# Patient Record
Sex: MALE | Race: WHITE | NOT HISPANIC OR LATINO | URBAN - METROPOLITAN AREA
[De-identification: names, ages, dates, MRNs, and addresses within clinical notes are randomized per-mention and may not be internally consistent; named-entity substitution may affect disease eponyms.]

---

## 2020-01-01 ENCOUNTER — INPATIENT (INPATIENT)
Facility: HOSPITAL | Age: 65
LOS: 2 days | DRG: 193 | End: 2020-08-22
Attending: HOSPITALIST | Admitting: HOSPITALIST
Payer: COMMERCIAL

## 2020-01-01 VITALS
HEART RATE: 91 BPM | WEIGHT: 162.04 LBS | RESPIRATION RATE: 22 BRPM | SYSTOLIC BLOOD PRESSURE: 100 MMHG | TEMPERATURE: 98 F | DIASTOLIC BLOOD PRESSURE: 67 MMHG | OXYGEN SATURATION: 97 %

## 2020-01-01 VITALS — OXYGEN SATURATION: 92 %

## 2020-01-01 DIAGNOSIS — Z02.9 ENCOUNTER FOR ADMINISTRATIVE EXAMINATIONS, UNSPECIFIED: ICD-10-CM

## 2020-01-01 DIAGNOSIS — J96.01 ACUTE RESPIRATORY FAILURE WITH HYPOXIA: ICD-10-CM

## 2020-01-01 DIAGNOSIS — E87.2 ACIDOSIS: ICD-10-CM

## 2020-01-01 DIAGNOSIS — E87.1 HYPO-OSMOLALITY AND HYPONATREMIA: ICD-10-CM

## 2020-01-01 DIAGNOSIS — R18.8 OTHER ASCITES: ICD-10-CM

## 2020-01-01 DIAGNOSIS — Z51.5 ENCOUNTER FOR PALLIATIVE CARE: ICD-10-CM

## 2020-01-01 DIAGNOSIS — J44.9 CHRONIC OBSTRUCTIVE PULMONARY DISEASE, UNSPECIFIED: ICD-10-CM

## 2020-01-01 DIAGNOSIS — R64 CACHEXIA: ICD-10-CM

## 2020-01-01 DIAGNOSIS — E87.5 HYPERKALEMIA: ICD-10-CM

## 2020-01-01 DIAGNOSIS — R06.02 SHORTNESS OF BREATH: ICD-10-CM

## 2020-01-01 DIAGNOSIS — C25.9 MALIGNANT NEOPLASM OF PANCREAS, UNSPECIFIED: ICD-10-CM

## 2020-01-01 DIAGNOSIS — Z29.9 ENCOUNTER FOR PROPHYLACTIC MEASURES, UNSPECIFIED: ICD-10-CM

## 2020-01-01 DIAGNOSIS — R11.2 NAUSEA WITH VOMITING, UNSPECIFIED: ICD-10-CM

## 2020-01-01 DIAGNOSIS — G93.41 METABOLIC ENCEPHALOPATHY: ICD-10-CM

## 2020-01-01 DIAGNOSIS — N17.9 ACUTE KIDNEY FAILURE, UNSPECIFIED: ICD-10-CM

## 2020-01-01 DIAGNOSIS — Z96.641 PRESENCE OF RIGHT ARTIFICIAL HIP JOINT: Chronic | ICD-10-CM

## 2020-01-01 DIAGNOSIS — R53.2 FUNCTIONAL QUADRIPLEGIA: ICD-10-CM

## 2020-01-01 LAB
ALBUMIN FLD-MCNC: 1.4 G/DL — SIGNIFICANT CHANGE UP
ALBUMIN SERPL ELPH-MCNC: 3.1 G/DL — LOW (ref 3.3–5)
ALBUMIN SERPL ELPH-MCNC: 3.2 G/DL — LOW (ref 3.3–5)
ALBUMIN SERPL ELPH-MCNC: 3.2 G/DL — LOW (ref 3.3–5)
ALP SERPL-CCNC: 132 U/L — HIGH (ref 40–120)
ALP SERPL-CCNC: 167 U/L — HIGH (ref 40–120)
ALP SERPL-CCNC: 167 U/L — HIGH (ref 40–120)
ALT FLD-CCNC: 18 U/L — SIGNIFICANT CHANGE UP (ref 10–45)
ALT FLD-CCNC: 23 U/L — SIGNIFICANT CHANGE UP (ref 10–45)
ALT FLD-CCNC: 25 U/L — SIGNIFICANT CHANGE UP (ref 10–45)
ANION GAP SERPL CALC-SCNC: 15 MMOL/L — SIGNIFICANT CHANGE UP (ref 5–17)
ANION GAP SERPL CALC-SCNC: 16 MMOL/L — SIGNIFICANT CHANGE UP (ref 5–17)
ANION GAP SERPL CALC-SCNC: 17 MMOL/L — SIGNIFICANT CHANGE UP (ref 5–17)
ANION GAP SERPL CALC-SCNC: 17 MMOL/L — SIGNIFICANT CHANGE UP (ref 5–17)
ANION GAP SERPL CALC-SCNC: 19 MMOL/L — HIGH (ref 5–17)
ANION GAP SERPL CALC-SCNC: 21 MMOL/L — HIGH (ref 5–17)
ANISOCYTOSIS BLD QL: SLIGHT — SIGNIFICANT CHANGE UP
APPEARANCE UR: ABNORMAL
APPEARANCE UR: ABNORMAL
APTT BLD: 31.4 SEC — SIGNIFICANT CHANGE UP (ref 27.5–35.5)
APTT BLD: 32 SEC — SIGNIFICANT CHANGE UP (ref 27.5–35.5)
AST SERPL-CCNC: 18 U/L — SIGNIFICANT CHANGE UP (ref 10–40)
AST SERPL-CCNC: 23 U/L — SIGNIFICANT CHANGE UP (ref 10–40)
AST SERPL-CCNC: 27 U/L — SIGNIFICANT CHANGE UP (ref 10–40)
B PERT IGG+IGM PNL SER: ABNORMAL
BACTERIA # UR AUTO: NEGATIVE — SIGNIFICANT CHANGE UP
BASE EXCESS BLDV CALC-SCNC: -4.7 MMOL/L — LOW (ref -2–2)
BASOPHILS # BLD AUTO: 0 K/UL — SIGNIFICANT CHANGE UP (ref 0–0.2)
BASOPHILS # BLD AUTO: 0.02 K/UL — SIGNIFICANT CHANGE UP (ref 0–0.2)
BASOPHILS NFR BLD AUTO: 0 % — SIGNIFICANT CHANGE UP (ref 0–2)
BASOPHILS NFR BLD AUTO: 0.2 % — SIGNIFICANT CHANGE UP (ref 0–2)
BILIRUB SERPL-MCNC: 1 MG/DL — SIGNIFICANT CHANGE UP (ref 0.2–1.2)
BILIRUB SERPL-MCNC: 1.1 MG/DL — SIGNIFICANT CHANGE UP (ref 0.2–1.2)
BILIRUB SERPL-MCNC: 1.1 MG/DL — SIGNIFICANT CHANGE UP (ref 0.2–1.2)
BILIRUB UR-MCNC: NEGATIVE — SIGNIFICANT CHANGE UP
BILIRUB UR-MCNC: NEGATIVE — SIGNIFICANT CHANGE UP
BUN SERPL-MCNC: 78 MG/DL — HIGH (ref 7–23)
BUN SERPL-MCNC: 81 MG/DL — HIGH (ref 7–23)
BUN SERPL-MCNC: 89 MG/DL — HIGH (ref 7–23)
BUN SERPL-MCNC: 91 MG/DL — HIGH (ref 7–23)
BUN SERPL-MCNC: 92 MG/DL — HIGH (ref 7–23)
BUN SERPL-MCNC: 92 MG/DL — HIGH (ref 7–23)
CA-I SERPL-SCNC: 1.17 MMOL/L — SIGNIFICANT CHANGE UP (ref 1.12–1.3)
CALCIUM SERPL-MCNC: 8.5 MG/DL — SIGNIFICANT CHANGE UP (ref 8.4–10.5)
CALCIUM SERPL-MCNC: 8.7 MG/DL — SIGNIFICANT CHANGE UP (ref 8.4–10.5)
CALCIUM SERPL-MCNC: 8.7 MG/DL — SIGNIFICANT CHANGE UP (ref 8.4–10.5)
CALCIUM SERPL-MCNC: 8.8 MG/DL — SIGNIFICANT CHANGE UP (ref 8.4–10.5)
CALCIUM SERPL-MCNC: 8.8 MG/DL — SIGNIFICANT CHANGE UP (ref 8.4–10.5)
CALCIUM SERPL-MCNC: 8.9 MG/DL — SIGNIFICANT CHANGE UP (ref 8.4–10.5)
CHLORIDE BLDV-SCNC: 94 MMOL/L — LOW (ref 96–108)
CHLORIDE SERPL-SCNC: 88 MMOL/L — LOW (ref 96–108)
CHLORIDE SERPL-SCNC: 89 MMOL/L — LOW (ref 96–108)
CHLORIDE SERPL-SCNC: 90 MMOL/L — LOW (ref 96–108)
CHLORIDE SERPL-SCNC: 90 MMOL/L — LOW (ref 96–108)
CHLORIDE SERPL-SCNC: 91 MMOL/L — LOW (ref 96–108)
CHLORIDE SERPL-SCNC: 91 MMOL/L — LOW (ref 96–108)
CO2 BLDV-SCNC: 23 MMOL/L — SIGNIFICANT CHANGE UP (ref 22–30)
CO2 SERPL-SCNC: 17 MMOL/L — LOW (ref 22–31)
CO2 SERPL-SCNC: 18 MMOL/L — LOW (ref 22–31)
CO2 SERPL-SCNC: 18 MMOL/L — LOW (ref 22–31)
CO2 SERPL-SCNC: 19 MMOL/L — LOW (ref 22–31)
CO2 SERPL-SCNC: 20 MMOL/L — LOW (ref 22–31)
CO2 SERPL-SCNC: <10 MMOL/L — CRITICAL LOW (ref 22–31)
COLOR FLD: SIGNIFICANT CHANGE UP
COLOR SPEC: SIGNIFICANT CHANGE UP
COLOR SPEC: YELLOW — SIGNIFICANT CHANGE UP
COMMENT - FLUIDS: SIGNIFICANT CHANGE UP
CREAT ?TM UR-MCNC: 60 MG/DL — SIGNIFICANT CHANGE UP
CREAT SERPL-MCNC: 1.71 MG/DL — HIGH (ref 0.5–1.3)
CREAT SERPL-MCNC: 1.73 MG/DL — HIGH (ref 0.5–1.3)
CREAT SERPL-MCNC: 1.88 MG/DL — HIGH (ref 0.5–1.3)
CREAT SERPL-MCNC: 1.88 MG/DL — HIGH (ref 0.5–1.3)
CREAT SERPL-MCNC: 1.89 MG/DL — HIGH (ref 0.5–1.3)
CREAT SERPL-MCNC: 1.99 MG/DL — HIGH (ref 0.5–1.3)
CULTURE RESULTS: SIGNIFICANT CHANGE UP
DACRYOCYTES BLD QL SMEAR: SLIGHT — SIGNIFICANT CHANGE UP
DIFF PNL FLD: ABNORMAL
DIFF PNL FLD: ABNORMAL
EOSINOPHIL # BLD AUTO: 0 K/UL — SIGNIFICANT CHANGE UP (ref 0–0.5)
EOSINOPHIL # BLD AUTO: 0.01 K/UL — SIGNIFICANT CHANGE UP (ref 0–0.5)
EOSINOPHIL NFR BLD AUTO: 0 % — SIGNIFICANT CHANGE UP (ref 0–6)
EOSINOPHIL NFR BLD AUTO: 0.1 % — SIGNIFICANT CHANGE UP (ref 0–6)
EPI CELLS # UR: 1 — SIGNIFICANT CHANGE UP
FLUID INTAKE SUBSTANCE CLASS: SIGNIFICANT CHANGE UP
FLUID SEGMENTED GRANULOCYTES: 11 % — SIGNIFICANT CHANGE UP
GAS PNL BLDV: 123 MMOL/L — LOW (ref 135–145)
GAS PNL BLDV: SIGNIFICANT CHANGE UP
GLUCOSE BLDV-MCNC: 193 MG/DL — HIGH (ref 70–99)
GLUCOSE FLD-MCNC: 168 MG/DL — SIGNIFICANT CHANGE UP
GLUCOSE SERPL-MCNC: 146 MG/DL — HIGH (ref 70–99)
GLUCOSE SERPL-MCNC: 196 MG/DL — HIGH (ref 70–99)
GLUCOSE SERPL-MCNC: 208 MG/DL — HIGH (ref 70–99)
GLUCOSE SERPL-MCNC: 209 MG/DL — HIGH (ref 70–99)
GLUCOSE SERPL-MCNC: 213 MG/DL — HIGH (ref 70–99)
GLUCOSE SERPL-MCNC: 248 MG/DL — HIGH (ref 70–99)
GLUCOSE UR QL: NEGATIVE — SIGNIFICANT CHANGE UP
GLUCOSE UR QL: NEGATIVE — SIGNIFICANT CHANGE UP
GRAM STN FLD: SIGNIFICANT CHANGE UP
GRAM STN FLD: SIGNIFICANT CHANGE UP
HCO3 BLDV-SCNC: 22 MMOL/L — SIGNIFICANT CHANGE UP (ref 21–29)
HCT VFR BLD CALC: 31.1 % — LOW (ref 39–50)
HCT VFR BLD CALC: 31.4 % — LOW (ref 39–50)
HCT VFR BLD CALC: 32.5 % — LOW (ref 39–50)
HCT VFR BLDA CALC: 33 % — LOW (ref 39–50)
HCV AB S/CO SERPL IA: 0.09 S/CO — SIGNIFICANT CHANGE UP (ref 0–0.99)
HCV AB SERPL-IMP: SIGNIFICANT CHANGE UP
HGB BLD CALC-MCNC: 10.7 G/DL — LOW (ref 13–17)
HGB BLD-MCNC: 10.2 G/DL — LOW (ref 13–17)
HGB BLD-MCNC: 10.3 G/DL — LOW (ref 13–17)
HGB BLD-MCNC: 10.8 G/DL — LOW (ref 13–17)
HYALINE CASTS # UR AUTO: 1 /LPF — SIGNIFICANT CHANGE UP (ref 0–7)
IMM GRANULOCYTES NFR BLD AUTO: 0.7 % — SIGNIFICANT CHANGE UP (ref 0–1.5)
INR BLD: 1.54 RATIO — HIGH (ref 0.88–1.16)
INR BLD: 1.63 RATIO — HIGH (ref 0.88–1.16)
KETONES UR-MCNC: NEGATIVE — SIGNIFICANT CHANGE UP
KETONES UR-MCNC: NEGATIVE — SIGNIFICANT CHANGE UP
LACTATE BLDV-MCNC: 2.4 MMOL/L — HIGH (ref 0.7–2)
LACTATE BLDV-MCNC: 2.6 MMOL/L — HIGH (ref 0.7–2)
LDH SERPL L TO P-CCNC: 196 U/L — SIGNIFICANT CHANGE UP
LEUKOCYTE ESTERASE UR-ACNC: ABNORMAL
LEUKOCYTE ESTERASE UR-ACNC: NEGATIVE — SIGNIFICANT CHANGE UP
LIDOCAIN IGE QN: 15 U/L — SIGNIFICANT CHANGE UP (ref 7–60)
LYMPHOCYTES # BLD AUTO: 0 % — LOW (ref 13–44)
LYMPHOCYTES # BLD AUTO: 0 K/UL — LOW (ref 1–3.3)
LYMPHOCYTES # BLD AUTO: 0.31 K/UL — LOW (ref 1–3.3)
LYMPHOCYTES # BLD AUTO: 2.6 % — LOW (ref 13–44)
LYMPHOCYTES # FLD: 76 % — SIGNIFICANT CHANGE UP
MACROCYTES BLD QL: SLIGHT — SIGNIFICANT CHANGE UP
MAGNESIUM SERPL-MCNC: 2.5 MG/DL — SIGNIFICANT CHANGE UP (ref 1.6–2.6)
MAGNESIUM SERPL-MCNC: 2.6 MG/DL — SIGNIFICANT CHANGE UP (ref 1.6–2.6)
MAGNESIUM SERPL-MCNC: 2.7 MG/DL — HIGH (ref 1.6–2.6)
MAGNESIUM SERPL-MCNC: 2.8 MG/DL — HIGH (ref 1.6–2.6)
MANUAL SMEAR VERIFICATION: SIGNIFICANT CHANGE UP
MCHC RBC-ENTMCNC: 30.3 PG — SIGNIFICANT CHANGE UP (ref 27–34)
MCHC RBC-ENTMCNC: 30.3 PG — SIGNIFICANT CHANGE UP (ref 27–34)
MCHC RBC-ENTMCNC: 30.5 PG — SIGNIFICANT CHANGE UP (ref 27–34)
MCHC RBC-ENTMCNC: 32.5 GM/DL — SIGNIFICANT CHANGE UP (ref 32–36)
MCHC RBC-ENTMCNC: 33.1 GM/DL — SIGNIFICANT CHANGE UP (ref 32–36)
MCHC RBC-ENTMCNC: 33.2 GM/DL — SIGNIFICANT CHANGE UP (ref 32–36)
MCV RBC AUTO: 91.5 FL — SIGNIFICANT CHANGE UP (ref 80–100)
MCV RBC AUTO: 91.8 FL — SIGNIFICANT CHANGE UP (ref 80–100)
MCV RBC AUTO: 93.2 FL — SIGNIFICANT CHANGE UP (ref 80–100)
MESOTHL CELL # FLD: 1 % — SIGNIFICANT CHANGE UP
METAMYELOCYTES # FLD: 2.7 % — HIGH (ref 0–0)
METHOD TYPE: SIGNIFICANT CHANGE UP
MONOCYTES # BLD AUTO: 0.29 K/UL — SIGNIFICANT CHANGE UP (ref 0–0.9)
MONOCYTES # BLD AUTO: 0.73 K/UL — SIGNIFICANT CHANGE UP (ref 0–0.9)
MONOCYTES NFR BLD AUTO: 3.6 % — SIGNIFICANT CHANGE UP (ref 2–14)
MONOCYTES NFR BLD AUTO: 6.2 % — SIGNIFICANT CHANGE UP (ref 2–14)
MONOS+MACROS # FLD: 10 % — SIGNIFICANT CHANGE UP
MYELOCYTES NFR BLD: 2.7 % — HIGH (ref 0–0)
NEUTROPHILS # BLD AUTO: 10.7 K/UL — HIGH (ref 1.8–7.4)
NEUTROPHILS # BLD AUTO: 7.39 K/UL — SIGNIFICANT CHANGE UP (ref 1.8–7.4)
NEUTROPHILS NFR BLD AUTO: 79.4 % — HIGH (ref 43–77)
NEUTROPHILS NFR BLD AUTO: 90.2 % — HIGH (ref 43–77)
NEUTS BAND # BLD: 11.6 % — HIGH (ref 0–8)
NITRITE UR-MCNC: NEGATIVE — SIGNIFICANT CHANGE UP
NITRITE UR-MCNC: NEGATIVE — SIGNIFICANT CHANGE UP
NRBC # BLD: 0 /100 WBCS — SIGNIFICANT CHANGE UP (ref 0–0)
NRBC # BLD: 0 /100 WBCS — SIGNIFICANT CHANGE UP (ref 0–0)
OSMOLALITY SERPL: 305 MOSMOL/KG — HIGH (ref 280–301)
OSMOLALITY UR: 361 MOS/KG — SIGNIFICANT CHANGE UP (ref 300–900)
OTHER CELLS FLD MANUAL: 2 % — SIGNIFICANT CHANGE UP
P AERUGINOSA DNA BLD POS NAA+NON-PROBE: SIGNIFICANT CHANGE UP
PCO2 BLDV: 48 MMHG — SIGNIFICANT CHANGE UP (ref 35–50)
PH BLDV: 7.28 — LOW (ref 7.35–7.45)
PH UR: 6 — SIGNIFICANT CHANGE UP (ref 5–8)
PH UR: 6 — SIGNIFICANT CHANGE UP (ref 5–8)
PHOSPHATE SERPL-MCNC: 4 MG/DL — SIGNIFICANT CHANGE UP (ref 2.5–4.5)
PHOSPHATE SERPL-MCNC: 4.5 MG/DL — SIGNIFICANT CHANGE UP (ref 2.5–4.5)
PHOSPHATE SERPL-MCNC: 4.6 MG/DL — HIGH (ref 2.5–4.5)
PHOSPHATE SERPL-MCNC: 5.1 MG/DL — HIGH (ref 2.5–4.5)
PLAT MORPH BLD: ABNORMAL
PLATELET # BLD AUTO: 210 K/UL — SIGNIFICANT CHANGE UP (ref 150–400)
PLATELET # BLD AUTO: 220 K/UL — SIGNIFICANT CHANGE UP (ref 150–400)
PLATELET # BLD AUTO: 223 K/UL — SIGNIFICANT CHANGE UP (ref 150–400)
PO2 BLDV: 29 MMHG — SIGNIFICANT CHANGE UP (ref 25–45)
POIKILOCYTOSIS BLD QL AUTO: SLIGHT — SIGNIFICANT CHANGE UP
POLYCHROMASIA BLD QL SMEAR: SLIGHT — SIGNIFICANT CHANGE UP
POTASSIUM BLDV-SCNC: 5.3 MMOL/L — SIGNIFICANT CHANGE UP (ref 3.5–5.3)
POTASSIUM SERPL-MCNC: 4.7 MMOL/L — SIGNIFICANT CHANGE UP (ref 3.5–5.3)
POTASSIUM SERPL-MCNC: 4.9 MMOL/L — SIGNIFICANT CHANGE UP (ref 3.5–5.3)
POTASSIUM SERPL-MCNC: 5.3 MMOL/L — SIGNIFICANT CHANGE UP (ref 3.5–5.3)
POTASSIUM SERPL-MCNC: 5.3 MMOL/L — SIGNIFICANT CHANGE UP (ref 3.5–5.3)
POTASSIUM SERPL-MCNC: 5.7 MMOL/L — HIGH (ref 3.5–5.3)
POTASSIUM SERPL-MCNC: 5.9 MMOL/L — HIGH (ref 3.5–5.3)
POTASSIUM SERPL-SCNC: 4.7 MMOL/L — SIGNIFICANT CHANGE UP (ref 3.5–5.3)
POTASSIUM SERPL-SCNC: 4.9 MMOL/L — SIGNIFICANT CHANGE UP (ref 3.5–5.3)
POTASSIUM SERPL-SCNC: 5.3 MMOL/L — SIGNIFICANT CHANGE UP (ref 3.5–5.3)
POTASSIUM SERPL-SCNC: 5.3 MMOL/L — SIGNIFICANT CHANGE UP (ref 3.5–5.3)
POTASSIUM SERPL-SCNC: 5.7 MMOL/L — HIGH (ref 3.5–5.3)
POTASSIUM SERPL-SCNC: 5.9 MMOL/L — HIGH (ref 3.5–5.3)
PROT FLD-MCNC: 2.3 G/DL — SIGNIFICANT CHANGE UP
PROT SERPL-MCNC: 5.4 G/DL — LOW (ref 6–8.3)
PROT SERPL-MCNC: 5.8 G/DL — LOW (ref 6–8.3)
PROT SERPL-MCNC: 6 G/DL — SIGNIFICANT CHANGE UP (ref 6–8.3)
PROT UR-MCNC: ABNORMAL
PROT UR-MCNC: NEGATIVE — SIGNIFICANT CHANGE UP
PROTHROM AB SERPL-ACNC: 18 SEC — HIGH (ref 10.6–13.6)
PROTHROM AB SERPL-ACNC: 19 SEC — HIGH (ref 10.6–13.6)
RBC # BLD: 3.37 M/UL — LOW (ref 4.2–5.8)
RBC # BLD: 3.4 M/UL — LOW (ref 4.2–5.8)
RBC # BLD: 3.54 M/UL — LOW (ref 4.2–5.8)
RBC # FLD: 14.8 % — HIGH (ref 10.3–14.5)
RBC # FLD: 15.2 % — HIGH (ref 10.3–14.5)
RBC # FLD: 15.2 % — HIGH (ref 10.3–14.5)
RBC BLD AUTO: ABNORMAL
RBC CASTS # UR COMP ASSIST: >50 /HPF — HIGH (ref 0–4)
RCV VOL RI: HIGH /UL (ref 0–0)
SAO2 % BLDV: 48 % — LOW (ref 67–88)
SARS-COV-2 IGG SERPL QL IA: NEGATIVE — SIGNIFICANT CHANGE UP
SARS-COV-2 IGM SERPL IA-ACNC: <0.1 INDEX — SIGNIFICANT CHANGE UP
SARS-COV-2 RNA SPEC QL NAA+PROBE: SIGNIFICANT CHANGE UP
SODIUM SERPL-SCNC: 122 MMOL/L — LOW (ref 135–145)
SODIUM SERPL-SCNC: 124 MMOL/L — LOW (ref 135–145)
SODIUM SERPL-SCNC: 125 MMOL/L — LOW (ref 135–145)
SODIUM UR-SCNC: <35 MMOL/L — SIGNIFICANT CHANGE UP
SP GR SPEC: 1 — LOW (ref 1.01–1.02)
SP GR SPEC: 1.02 — SIGNIFICANT CHANGE UP (ref 1.01–1.02)
SPECIMEN SOURCE: SIGNIFICANT CHANGE UP
SPECIMEN SOURCE: SIGNIFICANT CHANGE UP
TOTAL NUCLEATED CELL COUNT, BODY FLUID: 52 /UL — SIGNIFICANT CHANGE UP
TROPONIN T, HIGH SENSITIVITY RESULT: 72 NG/L — HIGH (ref 0–51)
TROPONIN T, HIGH SENSITIVITY RESULT: 76 NG/L — HIGH (ref 0–51)
TUBE TYPE: SIGNIFICANT CHANGE UP
UROBILINOGEN FLD QL: ABNORMAL
UROBILINOGEN FLD QL: NEGATIVE — SIGNIFICANT CHANGE UP
UUN UR-MCNC: 586 MG/DL — SIGNIFICANT CHANGE UP
WBC # BLD: 11.85 K/UL — HIGH (ref 3.8–10.5)
WBC # BLD: 13.41 K/UL — HIGH (ref 3.8–10.5)
WBC # BLD: 8.12 K/UL — SIGNIFICANT CHANGE UP (ref 3.8–10.5)
WBC # FLD AUTO: 11.85 K/UL — HIGH (ref 3.8–10.5)
WBC # FLD AUTO: 13.41 K/UL — HIGH (ref 3.8–10.5)
WBC # FLD AUTO: 8.12 K/UL — SIGNIFICANT CHANGE UP (ref 3.8–10.5)
WBC UR QL: 2 /HPF — SIGNIFICANT CHANGE UP (ref 0–5)

## 2020-01-01 PROCEDURE — 87102 FUNGUS ISOLATION CULTURE: CPT

## 2020-01-01 PROCEDURE — 87086 URINE CULTURE/COLONY COUNT: CPT

## 2020-01-01 PROCEDURE — 83690 ASSAY OF LIPASE: CPT

## 2020-01-01 PROCEDURE — 93308 TTE F-UP OR LMTD: CPT | Mod: 26

## 2020-01-01 PROCEDURE — 82565 ASSAY OF CREATININE: CPT

## 2020-01-01 PROCEDURE — 84540 ASSAY OF URINE/UREA-N: CPT

## 2020-01-01 PROCEDURE — 99233 SBSQ HOSP IP/OBS HIGH 50: CPT | Mod: GC

## 2020-01-01 PROCEDURE — 83935 ASSAY OF URINE OSMOLALITY: CPT

## 2020-01-01 PROCEDURE — 49083 ABD PARACENTESIS W/IMAGING: CPT

## 2020-01-01 PROCEDURE — 82042 OTHER SOURCE ALBUMIN QUAN EA: CPT

## 2020-01-01 PROCEDURE — 84300 ASSAY OF URINE SODIUM: CPT

## 2020-01-01 PROCEDURE — 82945 GLUCOSE OTHER FLUID: CPT

## 2020-01-01 PROCEDURE — 76700 US EXAM ABDOM COMPLETE: CPT

## 2020-01-01 PROCEDURE — 83930 ASSAY OF BLOOD OSMOLALITY: CPT

## 2020-01-01 PROCEDURE — 82570 ASSAY OF URINE CREATININE: CPT

## 2020-01-01 PROCEDURE — U0003: CPT

## 2020-01-01 PROCEDURE — 97162 PT EVAL MOD COMPLEX 30 MIN: CPT

## 2020-01-01 PROCEDURE — 71250 CT THORAX DX C-: CPT | Mod: 26

## 2020-01-01 PROCEDURE — 82947 ASSAY GLUCOSE BLOOD QUANT: CPT

## 2020-01-01 PROCEDURE — 88112 CYTOPATH CELL ENHANCE TECH: CPT

## 2020-01-01 PROCEDURE — 88305 TISSUE EXAM BY PATHOLOGIST: CPT

## 2020-01-01 PROCEDURE — 93306 TTE W/DOPPLER COMPLETE: CPT | Mod: 26

## 2020-01-01 PROCEDURE — 74176 CT ABD & PELVIS W/O CONTRAST: CPT

## 2020-01-01 PROCEDURE — 80053 COMPREHEN METABOLIC PANEL: CPT

## 2020-01-01 PROCEDURE — 88305 TISSUE EXAM BY PATHOLOGIST: CPT | Mod: 26

## 2020-01-01 PROCEDURE — 84484 ASSAY OF TROPONIN QUANT: CPT

## 2020-01-01 PROCEDURE — 93005 ELECTROCARDIOGRAM TRACING: CPT

## 2020-01-01 PROCEDURE — 71250 CT THORAX DX C-: CPT

## 2020-01-01 PROCEDURE — 93306 TTE W/DOPPLER COMPLETE: CPT

## 2020-01-01 PROCEDURE — 81001 URINALYSIS AUTO W/SCOPE: CPT

## 2020-01-01 PROCEDURE — 71045 X-RAY EXAM CHEST 1 VIEW: CPT | Mod: 26

## 2020-01-01 PROCEDURE — 84132 ASSAY OF SERUM POTASSIUM: CPT

## 2020-01-01 PROCEDURE — 87070 CULTURE OTHR SPECIMN AEROBIC: CPT

## 2020-01-01 PROCEDURE — 88112 CYTOPATH CELL ENHANCE TECH: CPT | Mod: 26

## 2020-01-01 PROCEDURE — 87150 DNA/RNA AMPLIFIED PROBE: CPT

## 2020-01-01 PROCEDURE — 99223 1ST HOSP IP/OBS HIGH 75: CPT | Mod: GC

## 2020-01-01 PROCEDURE — 82962 GLUCOSE BLOOD TEST: CPT

## 2020-01-01 PROCEDURE — 83615 LACTATE (LD) (LDH) ENZYME: CPT

## 2020-01-01 PROCEDURE — 86769 SARS-COV-2 COVID-19 ANTIBODY: CPT

## 2020-01-01 PROCEDURE — 85027 COMPLETE CBC AUTOMATED: CPT

## 2020-01-01 PROCEDURE — 83735 ASSAY OF MAGNESIUM: CPT

## 2020-01-01 PROCEDURE — 93010 ELECTROCARDIOGRAM REPORT: CPT

## 2020-01-01 PROCEDURE — 89051 BODY FLUID CELL COUNT: CPT

## 2020-01-01 PROCEDURE — 82330 ASSAY OF CALCIUM: CPT

## 2020-01-01 PROCEDURE — 85610 PROTHROMBIN TIME: CPT

## 2020-01-01 PROCEDURE — 82803 BLOOD GASES ANY COMBINATION: CPT

## 2020-01-01 PROCEDURE — 93308 TTE F-UP OR LMTD: CPT

## 2020-01-01 PROCEDURE — 99223 1ST HOSP IP/OBS HIGH 75: CPT

## 2020-01-01 PROCEDURE — 99233 SBSQ HOSP IP/OBS HIGH 50: CPT

## 2020-01-01 PROCEDURE — 80048 BASIC METABOLIC PNL TOTAL CA: CPT

## 2020-01-01 PROCEDURE — 99285 EMERGENCY DEPT VISIT HI MDM: CPT | Mod: GC

## 2020-01-01 PROCEDURE — 84100 ASSAY OF PHOSPHORUS: CPT

## 2020-01-01 PROCEDURE — 87040 BLOOD CULTURE FOR BACTERIA: CPT

## 2020-01-01 PROCEDURE — 76700 US EXAM ABDOM COMPLETE: CPT | Mod: 26

## 2020-01-01 PROCEDURE — 87186 SC STD MICRODIL/AGAR DIL: CPT

## 2020-01-01 PROCEDURE — 85730 THROMBOPLASTIN TIME PARTIAL: CPT

## 2020-01-01 PROCEDURE — 84295 ASSAY OF SERUM SODIUM: CPT

## 2020-01-01 PROCEDURE — 87075 CULTR BACTERIA EXCEPT BLOOD: CPT

## 2020-01-01 PROCEDURE — 82435 ASSAY OF BLOOD CHLORIDE: CPT

## 2020-01-01 PROCEDURE — 86803 HEPATITIS C AB TEST: CPT

## 2020-01-01 PROCEDURE — P9047: CPT

## 2020-01-01 PROCEDURE — 71045 X-RAY EXAM CHEST 1 VIEW: CPT

## 2020-01-01 PROCEDURE — 94640 AIRWAY INHALATION TREATMENT: CPT

## 2020-01-01 PROCEDURE — 87205 SMEAR GRAM STAIN: CPT

## 2020-01-01 PROCEDURE — 74176 CT ABD & PELVIS W/O CONTRAST: CPT | Mod: 26

## 2020-01-01 PROCEDURE — 85014 HEMATOCRIT: CPT

## 2020-01-01 PROCEDURE — 88108 CYTOPATH CONCENTRATE TECH: CPT | Mod: 26,59

## 2020-01-01 PROCEDURE — C1729: CPT

## 2020-01-01 PROCEDURE — 83605 ASSAY OF LACTIC ACID: CPT

## 2020-01-01 PROCEDURE — 84157 ASSAY OF PROTEIN OTHER: CPT

## 2020-01-01 RX ORDER — BUPRENORPHINE AND NALOXONE 2; .5 MG/1; MG/1
2 TABLET SUBLINGUAL DAILY
Refills: 0 | Status: DISCONTINUED | OUTPATIENT
Start: 2020-01-01 | End: 2020-01-01

## 2020-01-01 RX ORDER — ACETAMINOPHEN 500 MG
2 TABLET ORAL
Qty: 0 | Refills: 0 | DISCHARGE

## 2020-01-01 RX ORDER — IPRATROPIUM/ALBUTEROL SULFATE 18-103MCG
3 AEROSOL WITH ADAPTER (GRAM) INHALATION EVERY 6 HOURS
Refills: 0 | Status: DISCONTINUED | OUTPATIENT
Start: 2020-01-01 | End: 2020-01-01

## 2020-01-01 RX ORDER — DOCUSATE SODIUM 100 MG
2 CAPSULE ORAL
Qty: 0 | Refills: 0 | DISCHARGE

## 2020-01-01 RX ORDER — HYDROMORPHONE HYDROCHLORIDE 2 MG/ML
1 INJECTION INTRAMUSCULAR; INTRAVENOUS; SUBCUTANEOUS EVERY 4 HOURS
Refills: 0 | Status: DISCONTINUED | OUTPATIENT
Start: 2020-01-01 | End: 2020-01-01

## 2020-01-01 RX ORDER — IPRATROPIUM/ALBUTEROL SULFATE 18-103MCG
3 AEROSOL WITH ADAPTER (GRAM) INHALATION
Qty: 0 | Refills: 0 | DISCHARGE

## 2020-01-01 RX ORDER — SODIUM CHLORIDE 9 MG/ML
3 INJECTION INTRAMUSCULAR; INTRAVENOUS; SUBCUTANEOUS
Refills: 0 | Status: DISCONTINUED | OUTPATIENT
Start: 2020-01-01 | End: 2020-01-01

## 2020-01-01 RX ORDER — METOCLOPRAMIDE HCL 10 MG
1 TABLET ORAL
Qty: 0 | Refills: 0 | DISCHARGE
Start: 2020-01-01

## 2020-01-01 RX ORDER — MIDODRINE HYDROCHLORIDE 2.5 MG/1
1 TABLET ORAL
Qty: 0 | Refills: 0 | DISCHARGE

## 2020-01-01 RX ORDER — MIDODRINE HYDROCHLORIDE 2.5 MG/1
20 TABLET ORAL EVERY 8 HOURS
Refills: 0 | Status: DISCONTINUED | OUTPATIENT
Start: 2020-01-01 | End: 2020-01-01

## 2020-01-01 RX ORDER — ALBUMIN HUMAN 25 %
100 VIAL (ML) INTRAVENOUS ONCE
Refills: 0 | Status: COMPLETED | OUTPATIENT
Start: 2020-01-01 | End: 2020-01-01

## 2020-01-01 RX ORDER — BUPRENORPHINE AND NALOXONE 2; .5 MG/1; MG/1
1 TABLET SUBLINGUAL
Refills: 0 | Status: DISCONTINUED | OUTPATIENT
Start: 2020-01-01 | End: 2020-01-01

## 2020-01-01 RX ORDER — ONDANSETRON 8 MG/1
4 TABLET, FILM COATED ORAL EVERY 8 HOURS
Refills: 0 | Status: DISCONTINUED | OUTPATIENT
Start: 2020-01-01 | End: 2020-01-01

## 2020-01-01 RX ORDER — SODIUM ZIRCONIUM CYCLOSILICATE 10 G/10G
10 POWDER, FOR SUSPENSION ORAL ONCE
Refills: 0 | Status: DISCONTINUED | OUTPATIENT
Start: 2020-01-01 | End: 2020-01-01

## 2020-01-01 RX ORDER — SIMETHICONE 80 MG/1
1 TABLET, CHEWABLE ORAL
Qty: 0 | Refills: 0 | DISCHARGE

## 2020-01-01 RX ORDER — ASCORBIC ACID 60 MG
1 TABLET,CHEWABLE ORAL
Qty: 0 | Refills: 0 | DISCHARGE

## 2020-01-01 RX ORDER — OMEPRAZOLE 10 MG/1
1 CAPSULE, DELAYED RELEASE ORAL
Qty: 0 | Refills: 0 | DISCHARGE

## 2020-01-01 RX ORDER — PIPERACILLIN AND TAZOBACTAM 4; .5 G/20ML; G/20ML
3.38 INJECTION, POWDER, LYOPHILIZED, FOR SOLUTION INTRAVENOUS ONCE
Refills: 0 | Status: COMPLETED | OUTPATIENT
Start: 2020-01-01 | End: 2020-01-01

## 2020-01-01 RX ORDER — APIXABAN 2.5 MG/1
2.5 TABLET, FILM COATED ORAL EVERY 12 HOURS
Refills: 0 | Status: DISCONTINUED | OUTPATIENT
Start: 2020-01-01 | End: 2020-01-01

## 2020-01-01 RX ORDER — SODIUM BICARBONATE 1 MEQ/ML
0.24 SYRINGE (ML) INTRAVENOUS
Qty: 150 | Refills: 0 | Status: DISCONTINUED | OUTPATIENT
Start: 2020-01-01 | End: 2020-01-01

## 2020-01-01 RX ORDER — OXYCODONE HYDROCHLORIDE 5 MG/1
1 TABLET ORAL
Qty: 0 | Refills: 0 | DISCHARGE

## 2020-01-01 RX ORDER — DEXTROSE 50 % IN WATER 50 %
50 SYRINGE (ML) INTRAVENOUS ONCE
Refills: 0 | Status: COMPLETED | OUTPATIENT
Start: 2020-01-01 | End: 2020-01-01

## 2020-01-01 RX ORDER — PIPERACILLIN AND TAZOBACTAM 4; .5 G/20ML; G/20ML
3.38 INJECTION, POWDER, LYOPHILIZED, FOR SOLUTION INTRAVENOUS EVERY 12 HOURS
Refills: 0 | Status: DISCONTINUED | OUTPATIENT
Start: 2020-01-01 | End: 2020-01-01

## 2020-01-01 RX ORDER — ACETAMINOPHEN 500 MG
1000 TABLET ORAL EVERY 6 HOURS
Refills: 0 | Status: DISCONTINUED | OUTPATIENT
Start: 2020-01-01 | End: 2020-01-01

## 2020-01-01 RX ORDER — HYDROMORPHONE HYDROCHLORIDE 2 MG/ML
2 INJECTION INTRAMUSCULAR; INTRAVENOUS; SUBCUTANEOUS
Refills: 0 | Status: DISCONTINUED | OUTPATIENT
Start: 2020-01-01 | End: 2020-01-01

## 2020-01-01 RX ORDER — MIDODRINE HYDROCHLORIDE 2.5 MG/1
10 TABLET ORAL EVERY 8 HOURS
Refills: 0 | Status: DISCONTINUED | OUTPATIENT
Start: 2020-01-01 | End: 2020-01-01

## 2020-01-01 RX ORDER — MORPHINE SULFATE 50 MG/1
0.5 CAPSULE, EXTENDED RELEASE ORAL ONCE
Refills: 0 | Status: DISCONTINUED | OUTPATIENT
Start: 2020-01-01 | End: 2020-01-01

## 2020-01-01 RX ORDER — METOCLOPRAMIDE HCL 10 MG
10 TABLET ORAL ONCE
Refills: 0 | Status: COMPLETED | OUTPATIENT
Start: 2020-01-01 | End: 2020-01-01

## 2020-01-01 RX ORDER — MAGNESIUM HYDROXIDE 400 MG/1
30 TABLET, CHEWABLE ORAL DAILY
Refills: 0 | Status: DISCONTINUED | OUTPATIENT
Start: 2020-01-01 | End: 2020-01-01

## 2020-01-01 RX ORDER — BUPRENORPHINE AND NALOXONE 2; .5 MG/1; MG/1
1 TABLET SUBLINGUAL
Qty: 0 | Refills: 0 | DISCHARGE

## 2020-01-01 RX ORDER — BUDESONIDE AND FORMOTEROL FUMARATE DIHYDRATE 160; 4.5 UG/1; UG/1
2 AEROSOL RESPIRATORY (INHALATION)
Refills: 0 | Status: DISCONTINUED | OUTPATIENT
Start: 2020-01-01 | End: 2020-01-01

## 2020-01-01 RX ORDER — APIXABAN 2.5 MG/1
1 TABLET, FILM COATED ORAL
Qty: 0 | Refills: 0 | DISCHARGE

## 2020-01-01 RX ORDER — METOCLOPRAMIDE HCL 10 MG
5 TABLET ORAL THREE TIMES A DAY
Refills: 0 | Status: DISCONTINUED | OUTPATIENT
Start: 2020-01-01 | End: 2020-01-01

## 2020-01-01 RX ORDER — ENOXAPARIN SODIUM 100 MG/ML
40 INJECTION SUBCUTANEOUS DAILY
Refills: 0 | Status: DISCONTINUED | OUTPATIENT
Start: 2020-01-01 | End: 2020-01-01

## 2020-01-01 RX ORDER — ACETAMINOPHEN 500 MG
650 TABLET ORAL EVERY 6 HOURS
Refills: 0 | Status: DISCONTINUED | OUTPATIENT
Start: 2020-01-01 | End: 2020-01-01

## 2020-01-01 RX ORDER — INSULIN LISPRO 100/ML
0 VIAL (ML) SUBCUTANEOUS
Qty: 0 | Refills: 0 | DISCHARGE
Start: 2020-01-01

## 2020-01-01 RX ORDER — MAGNESIUM HYDROXIDE 400 MG/1
30 TABLET, CHEWABLE ORAL
Qty: 0 | Refills: 0 | DISCHARGE

## 2020-01-01 RX ORDER — ONDANSETRON 8 MG/1
4 TABLET, FILM COATED ORAL ONCE
Refills: 0 | Status: COMPLETED | OUTPATIENT
Start: 2020-01-01 | End: 2020-01-01

## 2020-01-01 RX ORDER — HYDROMORPHONE HYDROCHLORIDE 2 MG/ML
1 INJECTION INTRAMUSCULAR; INTRAVENOUS; SUBCUTANEOUS ONCE
Refills: 0 | Status: DISCONTINUED | OUTPATIENT
Start: 2020-01-01 | End: 2020-01-01

## 2020-01-01 RX ORDER — CITRIC ACID/SODIUM CITRATE 300-500 MG
30 SOLUTION, ORAL ORAL
Refills: 0 | Status: DISCONTINUED | OUTPATIENT
Start: 2020-01-01 | End: 2020-01-01

## 2020-01-01 RX ORDER — INSULIN HUMAN 100 [IU]/ML
10 INJECTION, SOLUTION SUBCUTANEOUS ONCE
Refills: 0 | Status: COMPLETED | OUTPATIENT
Start: 2020-01-01 | End: 2020-01-01

## 2020-01-01 RX ORDER — ASPIRIN/CALCIUM CARB/MAGNESIUM 324 MG
1 TABLET ORAL
Qty: 0 | Refills: 0 | DISCHARGE

## 2020-01-01 RX ORDER — SIMETHICONE 80 MG/1
80 TABLET, CHEWABLE ORAL EVERY 8 HOURS
Refills: 0 | Status: DISCONTINUED | OUTPATIENT
Start: 2020-01-01 | End: 2020-01-01

## 2020-01-01 RX ORDER — SENNA PLUS 8.6 MG/1
1 TABLET ORAL
Qty: 0 | Refills: 0 | DISCHARGE

## 2020-01-01 RX ORDER — HYDROMORPHONE HYDROCHLORIDE 2 MG/ML
0.5 INJECTION INTRAMUSCULAR; INTRAVENOUS; SUBCUTANEOUS EVERY 4 HOURS
Refills: 0 | Status: DISCONTINUED | OUTPATIENT
Start: 2020-01-01 | End: 2020-01-01

## 2020-01-01 RX ORDER — SODIUM ZIRCONIUM CYCLOSILICATE 10 G/10G
10 POWDER, FOR SUSPENSION ORAL EVERY 8 HOURS
Refills: 0 | Status: DISCONTINUED | OUTPATIENT
Start: 2020-01-01 | End: 2020-01-01

## 2020-01-01 RX ORDER — CALCIUM GLUCONATE 100 MG/ML
1 VIAL (ML) INTRAVENOUS ONCE
Refills: 0 | Status: COMPLETED | OUTPATIENT
Start: 2020-01-01 | End: 2020-01-01

## 2020-01-01 RX ORDER — PANTOPRAZOLE SODIUM 20 MG/1
40 TABLET, DELAYED RELEASE ORAL
Refills: 0 | Status: DISCONTINUED | OUTPATIENT
Start: 2020-01-01 | End: 2020-01-01

## 2020-01-01 RX ORDER — ASCORBIC ACID 60 MG
500 TABLET,CHEWABLE ORAL DAILY
Refills: 0 | Status: DISCONTINUED | OUTPATIENT
Start: 2020-01-01 | End: 2020-01-01

## 2020-01-01 RX ORDER — SODIUM BICARBONATE 1 MEQ/ML
0.08 SYRINGE (ML) INTRAVENOUS
Qty: 50 | Refills: 0 | Status: DISCONTINUED | OUTPATIENT
Start: 2020-01-01 | End: 2020-01-01

## 2020-01-01 RX ADMIN — Medication 500 MILLIGRAM(S): at 11:24

## 2020-01-01 RX ADMIN — ONDANSETRON 4 MILLIGRAM(S): 8 TABLET, FILM COATED ORAL at 15:57

## 2020-01-01 RX ADMIN — HYDROMORPHONE HYDROCHLORIDE 1 MILLIGRAM(S): 2 INJECTION INTRAMUSCULAR; INTRAVENOUS; SUBCUTANEOUS at 18:05

## 2020-01-01 RX ADMIN — Medication 3 MILLILITER(S): at 23:25

## 2020-01-01 RX ADMIN — MIDODRINE HYDROCHLORIDE 20 MILLIGRAM(S): 2.5 TABLET ORAL at 06:56

## 2020-01-01 RX ADMIN — BUPRENORPHINE AND NALOXONE 1 TABLET(S): 2; .5 TABLET SUBLINGUAL at 18:24

## 2020-01-01 RX ADMIN — Medication 100 GRAM(S): at 21:29

## 2020-01-01 RX ADMIN — Medication 5 MILLIGRAM(S): at 21:40

## 2020-01-01 RX ADMIN — Medication 1 APPLICATION(S): at 17:52

## 2020-01-01 RX ADMIN — APIXABAN 2.5 MILLIGRAM(S): 2.5 TABLET, FILM COATED ORAL at 06:55

## 2020-01-01 RX ADMIN — Medication 10 MILLIGRAM(S): at 16:39

## 2020-01-01 RX ADMIN — HYDROMORPHONE HYDROCHLORIDE 1 MILLIGRAM(S): 2 INJECTION INTRAMUSCULAR; INTRAVENOUS; SUBCUTANEOUS at 21:26

## 2020-01-01 RX ADMIN — Medication 50 MILLILITER(S): at 12:26

## 2020-01-01 RX ADMIN — SODIUM CHLORIDE 3 MILLILITER(S): 9 INJECTION INTRAMUSCULAR; INTRAVENOUS; SUBCUTANEOUS at 17:20

## 2020-01-01 RX ADMIN — BUDESONIDE AND FORMOTEROL FUMARATE DIHYDRATE 2 PUFF(S): 160; 4.5 AEROSOL RESPIRATORY (INHALATION) at 06:39

## 2020-01-01 RX ADMIN — INSULIN HUMAN 10 UNIT(S): 100 INJECTION, SOLUTION SUBCUTANEOUS at 12:31

## 2020-01-01 RX ADMIN — MORPHINE SULFATE 0.5 MILLIGRAM(S): 50 CAPSULE, EXTENDED RELEASE ORAL at 11:32

## 2020-01-01 RX ADMIN — HYDROMORPHONE HYDROCHLORIDE 2 MILLIGRAM(S): 2 INJECTION INTRAMUSCULAR; INTRAVENOUS; SUBCUTANEOUS at 00:30

## 2020-01-01 RX ADMIN — APIXABAN 2.5 MILLIGRAM(S): 2.5 TABLET, FILM COATED ORAL at 05:26

## 2020-01-01 RX ADMIN — BUPRENORPHINE AND NALOXONE 1 TABLET(S): 2; .5 TABLET SUBLINGUAL at 06:56

## 2020-01-01 RX ADMIN — Medication 100 MEQ/KG/HR: at 06:57

## 2020-01-01 RX ADMIN — Medication 100 MEQ/KG/HR: at 20:20

## 2020-01-01 RX ADMIN — SODIUM ZIRCONIUM CYCLOSILICATE 10 GRAM(S): 10 POWDER, FOR SUSPENSION ORAL at 20:33

## 2020-01-01 RX ADMIN — Medication 3 MILLILITER(S): at 23:10

## 2020-01-01 RX ADMIN — ONDANSETRON 4 MILLIGRAM(S): 8 TABLET, FILM COATED ORAL at 10:45

## 2020-01-01 RX ADMIN — Medication 1 TABLET(S): at 11:24

## 2020-01-01 RX ADMIN — Medication 5 MILLIGRAM(S): at 05:26

## 2020-01-01 RX ADMIN — APIXABAN 2.5 MILLIGRAM(S): 2.5 TABLET, FILM COATED ORAL at 18:21

## 2020-01-01 RX ADMIN — Medication 3 MILLILITER(S): at 11:54

## 2020-01-01 RX ADMIN — Medication 1 APPLICATION(S): at 05:26

## 2020-01-01 RX ADMIN — Medication 50 MILLILITER(S): at 12:27

## 2020-01-01 RX ADMIN — Medication 1 TABLET(S): at 22:15

## 2020-01-01 RX ADMIN — Medication 5 MILLIGRAM(S): at 15:20

## 2020-01-01 RX ADMIN — Medication 1 TABLET(S): at 23:40

## 2020-01-01 RX ADMIN — PANTOPRAZOLE SODIUM 40 MILLIGRAM(S): 20 TABLET, DELAYED RELEASE ORAL at 05:27

## 2020-01-01 RX ADMIN — HYDROMORPHONE HYDROCHLORIDE 2 MILLIGRAM(S): 2 INJECTION INTRAMUSCULAR; INTRAVENOUS; SUBCUTANEOUS at 23:51

## 2020-01-01 RX ADMIN — Medication 3 MILLILITER(S): at 17:19

## 2020-01-01 RX ADMIN — HYDROMORPHONE HYDROCHLORIDE 1 MILLIGRAM(S): 2 INJECTION INTRAMUSCULAR; INTRAVENOUS; SUBCUTANEOUS at 18:02

## 2020-01-01 RX ADMIN — BUDESONIDE AND FORMOTEROL FUMARATE DIHYDRATE 2 PUFF(S): 160; 4.5 AEROSOL RESPIRATORY (INHALATION) at 06:02

## 2020-01-01 RX ADMIN — ONDANSETRON 4 MILLIGRAM(S): 8 TABLET, FILM COATED ORAL at 23:40

## 2020-01-01 RX ADMIN — PIPERACILLIN AND TAZOBACTAM 25 GRAM(S): 4; .5 INJECTION, POWDER, LYOPHILIZED, FOR SOLUTION INTRAVENOUS at 18:04

## 2020-01-01 RX ADMIN — BUPRENORPHINE AND NALOXONE 1 TABLET(S): 2; .5 TABLET SUBLINGUAL at 05:26

## 2020-01-01 RX ADMIN — SODIUM CHLORIDE 3 MILLILITER(S): 9 INJECTION INTRAMUSCULAR; INTRAVENOUS; SUBCUTANEOUS at 11:59

## 2020-01-01 RX ADMIN — PANTOPRAZOLE SODIUM 40 MILLIGRAM(S): 20 TABLET, DELAYED RELEASE ORAL at 06:56

## 2020-01-01 RX ADMIN — Medication 5 MILLIGRAM(S): at 22:15

## 2020-01-01 RX ADMIN — HYDROMORPHONE HYDROCHLORIDE 1 MILLIGRAM(S): 2 INJECTION INTRAMUSCULAR; INTRAVENOUS; SUBCUTANEOUS at 22:00

## 2020-01-01 RX ADMIN — HYDROMORPHONE HYDROCHLORIDE 1 MILLIGRAM(S): 2 INJECTION INTRAMUSCULAR; INTRAVENOUS; SUBCUTANEOUS at 02:12

## 2020-01-01 RX ADMIN — Medication 3 MILLILITER(S): at 05:58

## 2020-01-01 RX ADMIN — HYDROMORPHONE HYDROCHLORIDE 1 MILLIGRAM(S): 2 INJECTION INTRAMUSCULAR; INTRAVENOUS; SUBCUTANEOUS at 13:58

## 2020-01-01 RX ADMIN — SODIUM ZIRCONIUM CYCLOSILICATE 10 GRAM(S): 10 POWDER, FOR SUSPENSION ORAL at 06:55

## 2020-01-01 RX ADMIN — Medication 1 APPLICATION(S): at 06:56

## 2020-01-01 RX ADMIN — PIPERACILLIN AND TAZOBACTAM 200 GRAM(S): 4; .5 INJECTION, POWDER, LYOPHILIZED, FOR SOLUTION INTRAVENOUS at 07:52

## 2020-01-01 RX ADMIN — Medication 5 MILLIGRAM(S): at 06:56

## 2020-01-01 RX ADMIN — Medication 1 APPLICATION(S): at 18:24

## 2020-08-19 NOTE — ED PROVIDER NOTE - NEUROLOGICAL, MLM
Alert and oriented, no focal deficits, no motor or sensory deficits.
rehabilitation facility/acute rehabilitation

## 2020-08-19 NOTE — ED ADULT NURSE NOTE - NS ED NURSE LEVEL OF CONSCIOUSNESS MENTAL STATUS
BROCK Ruby informed of need for blood transfusion. Consent obtained over the phone. Alert/Awake/Cooperative

## 2020-08-19 NOTE — ED PROVIDER NOTE - PROGRESS NOTE DETAILS
Patient labs reviewed, leukocytosis present, hyponatremia present. Hyponatremia likely 2/2 cirrhosis and ascites at this time. will hold fluids given patients fluid status with cirrhosis and ascites. Echo results noted at this time. [facility] management at this time. primary team at bedside. notes patient allowed to have sips of water at this time.

## 2020-08-19 NOTE — H&P ADULT - PROBLEM SELECTOR PLAN 6
Patient with h/o COPD, likely contributing to pt's SOB  - continue home Symbicort  - continue nasal cannula oxygen

## 2020-08-19 NOTE — ED PROVIDER NOTE - OBJECTIVE STATEMENT
Patient is a 64 year old male with hx of jaundice, pancreatic cancer presenting for hypotension x 1 day. As per EMS patient from NH found to be hypotensive at NH to 90 systolic. Upon arrival patients BP 110s systolic. Patient denies chest pain, sob, fever, chills, headache, nausea, emesis, diarrhea, abdominal pain, hematuria/dysuria or lower extremity swelling. Patient notes he does not like to be at the NH he is at now as he thinks they do not take care of him appropriately.

## 2020-08-19 NOTE — H&P ADULT - NSHPLABSRESULTS_GEN_ALL_CORE
10.8   11.85 )-----------( 220      ( 19 Aug 2020 14:46 )             32.5           125<L>  |  90<L>  |  78<H>  ----------------------------<  196<H>  5.3   |  19<L>  |  1.71<H>    Ca    8.9      19 Aug 2020 14:46    TPro  6.0  /  Alb  3.2<L>  /  TBili  1.1  /  DBili  x   /  AST  27  /  ALT  25  /  AlkPhos  167<H>                Urinalysis Basic - ( 19 Aug 2020 17:12 )    Color: Yellow / Appearance: Slightly Turbid / S.019 / pH: x  Gluc: x / Ketone: Negative  / Bili: Negative / Urobili: 3 mg/dL   Blood: x / Protein: 30 mg/dL / Nitrite: Negative   Leuk Esterase: Small / RBC: >50 /hpf / WBC 2 /HPF   Sq Epi: x / Non Sq Epi: 1 / Bacteria: Negative    Lactate Trend: 2.6-->2.4  CXR: L-sided pleural effusion   TTE: trace pericardial effusion      CAPILLARY BLOOD GLUCOSE      POCT Blood Glucose.: 213 mg/dL (19 Aug 2020 14:28)

## 2020-08-19 NOTE — ED ADULT NURSE NOTE - OBJECTIVE STATEMENT
Pt BIBA from [facility] for "hypotension."  EMS states pt 90s/70s on scene, pt arrives with systolic in low 100s.  Pt with hx of pancreatic cancer, ascites, copd on 3L NC baseline, drug abuse.  Pt very ill appearing, cachectic, abdomen distended, firm, non-tender, c/o b/l arm pain (chronic), b/l LE wrapped in clean, dry gauze, patches of dark red skin discoloration to chest, redness along spine, denies chest pain, shortness of breath, fever, abdominal pain, nausea/vomiting/diarrhea, urinary symptoms.

## 2020-08-19 NOTE — ED PROVIDER NOTE - CARE PLAN
Principal Discharge DX:	Hyponatremia  Assessment and plan of treatment:	64 year old male with hx as bellow presenting for hypotension x 1 day. No signs of hypotension at this time. will r/o metabolic vs cardiac vs infectious etiology at this time.     Plan:   Labs  CXR  ECHO

## 2020-08-19 NOTE — H&P ADULT - PROBLEM SELECTOR PLAN 5
Patient with Na 125 on admission. Likely multifactorial, 2/2 hypervolemic hyponatremia in s/o ascites + hyponatremia from malignancy   - for now will monitor, and treat by addressing underlying causes as per above Multifactorial, likely 2/2 obstruction from ascites, hypotension prior to admission could be contributing   - will monitor BMP and obtain urine studies to further evaluate  - post-void bladder scan to r/o obstructive ANNMARIE given ascites Multifactorial, likely 2/2 obstruction from ascites, hypotension prior to admission could be contributing   - will monitor BMP and obtain urine studies to further evaluate  - post-void bladder scan to r/o obstructive ANNMARIE given ascites  - elevated troponins likely 2/2 demand ischemia in s/o renal insufficiency. No signs/symptoms of ACS, no need to trend trops unless new c/o chest pain

## 2020-08-19 NOTE — ED ADULT NURSE NOTE - PMH
Ascites    Drug abuse    Essential hypertension    Pancreatic cancer    Simple chronic bronchitis  with home o2 PRN

## 2020-08-19 NOTE — ED PROVIDER NOTE - ATTENDING CONTRIBUTION TO CARE
Attending MD Schneider: I personally have seen and examined this patient.  Resident note reviewed and agree on plan of care and except where noted.  See below for details.     Seen in [place] 3/18    64M with PMH/PSH including pancreatic cancer (dx'ed 6/2020 [facility]), COPD presents to the ED sent in from nursing home with hypotension, vomiting.  As per EMS, patient sent in for SBPs 90s.  Denies chest pain, shortness of breath, palpitations. Denies abdominal pain, diarrhea, blood in stools.  Reports nausea, vomiting.  Denies dysuria, hematuria, change in urinary habits including frequency, urgency. Denies change in vision, double vision, sudden loss of vision. Denies fevers, chills.  A ten (10) point review of systems was negative other than as stated in the HPI or elsewhere in the chart.     Exam:   General: NAD, thin, ill appearing  HENT: head NCAT, airway patent with moist mucous membranes, no sublingual jaundice  Eyes: PERRL, no scleral icterus  Lungs: lungs CTAB with good inspiratory effort, no wheezing, no rhonchi, no rales  Cardiac: +S1S2, no m/r/g  GI: abdomen soft with +BS, +diffuse tenderness, no rebound, no guarding, +distention  : no CVAT  MSK: FROM at neck, no tenderness to midline palpation, no stepoffs along length of spine  Neuro: moving all extremities, sensory grossly intact, no gross neuro deficits  Psych: normal mood and affect  Skin: no jaundice    A/P: 64M with recent pancreatic ca diagnosis, here for reported hypotension, now SBPs 110s, given reported emesis, will give antiemetic, will defer IVFs at this time given abdominal distention, will obtain ED Echo, will obtain labs to evaluate for electrolyte derangement, dehydration, obstructive pathology, will obtain EKG, CBC, CXR and UA for infectious process, admit

## 2020-08-19 NOTE — H&P ADULT - PROBLEM SELECTOR PLAN 7
Diet: DASH/TLC  DVT: apixaban 2.5 mg Q12H (a home med, further investigating history to find out why he is on this)  CODE: full code

## 2020-08-19 NOTE — H&P ADULT - PROBLEM SELECTOR PLAN 1
Per patient + daughter, has pancreatic cancer that was diagnosed in June 2020 at [facility], no current access to records of treatment for more detail. Patient cachectic and with ascites c/w a diagnosis of pancreatic cancer  - Will consult Oncology   - for now, supportive care as per below  - Patient currently with poor insight into illness. Current status is full code. Will continue goals of care discussion Per patient + daughter, has pancreatic cancer that was diagnosed in June 2020 at [facility], no current access to records of treatment for more detail. Patient cachectic and with ascites c/w a diagnosis of pancreatic cancer  - Will consult Oncology   - for now, supportive care as per below  - Patient currently with poor insight into illness. Current status is full code. Will continue goals of care discussion  - nutrition consult

## 2020-08-19 NOTE — H&P ADULT - PROBLEM SELECTOR PLAN 4
Multifactorial, likely 2/2 obstruction from ascites, hypotension prior to admission could be contributing   - will monitor BMP and obtain urine studies to further evaluate Patient with Na 125 on admission. Likely multifactorial, 2/2 hypervolemic hyponatremia in s/o ascites + hyponatremia from malignancy. Unclear if patient was vomiting prior to admission, but could also be contributing.  - will try to obtain records to assess whether this is a chronic or acute issue   - repeat BMP put in at ~21:30. if Na+ downtrending, start IV NS @75ml/hr until AM labs  - 1 L fluid restriction

## 2020-08-19 NOTE — ED PROVIDER NOTE - PLAN OF CARE
64 year old male with hx as bellow presenting for hypotension x 1 day. No signs of hypotension at this time. will r/o metabolic vs cardiac vs infectious etiology at this time.     Plan:   Labs  CXR  ECHO

## 2020-08-19 NOTE — H&P ADULT - HISTORY OF PRESENT ILLNESS
64M Pmhx pancreatic cancer (dx'ed June 2020), COPD, presenting from nursing home with hypotension, hyponatremia and SOB.     History is per nursing home chart, patient, and pt's daughter Patient presents from nursing home after found to be hypotensive to 90's systolic. Upon arrival to ED, BP was 110 systolic, but patient was SOB with ascites and found to be hyponatremic to 125. Per pt's daughter patient was diagnosed with pancreatic cancer in June 2020 at [facility], and may have started to undergo treatment, but for unclear reasons was discharged to nursing home and lost to follow up. 64M Pmhx pancreatic cancer (dx'ed June 2020), COPD, presenting from nursing home with hypotension, hyponatremia and SOB.     History is per nursing home chart, patient, and pt's daughter. Patient presents from nursing home after found to be hypotensive to 90's systolic. Upon arrival to ED, BP was 110 systolic, but patient was SOB with ascites and found to be hyponatremic to 125. Per pt's daughter patient was diagnosed with pancreatic cancer in June 2020 at [facility], and may have started to undergo treatment, but for unclear reasons was discharged to nursing home and lost to follow up. When interviewed, patient was nauseas and vomiting bilious emesis. He reports that his nausea, abdominal distention, and SOB started 2-3 weeks ago. He also endorses generalized abdominal pain that is worse in RUQ, and lower extremity edema, for which came in wearing compression socks. He denies fever, chills, dizziness, headache, chest pain. He is unsure of further details and prognosis regarding his pancreatic cancer. He was found to be hyponatremic to 125, and was satting 99% on 3L NC. 64M Pmhx pancreatic cancer (dx'ed June 2020), COPD, presenting from nursing home with hypotension, hyponatremia and SOB.     History is per nursing home chart, patient, and pt's daughter. Patient presents from nursing home after found to be hypotensive to 90's systolic. Upon arrival to ED, BP was 110 systolic, but patient was SOB with ascites and found to be hyponatremic to 125. Per pt's daughter patient was diagnosed with pancreatic cancer in June 2020 at [facility], and may have started to undergo treatment, but for unclear reasons was discharged to nursing home and lost to follow up. When interviewed, patient was nauseas and vomiting bilious emesis. He reports that his nausea, abdominal distention, and SOB started 2-3 weeks ago. He also endorses generalized abdominal pain that is worse in RUQ, and lower extremity edema. He denies fever, chills, dizziness, headache, chest pain. He is unsure of further details and prognosis regarding his pancreatic cancer. He was found to be hyponatremic to 125, and was satting 99% on 3L NC.

## 2020-08-19 NOTE — H&P ADULT - PROBLEM SELECTOR PLAN 2
Patient with abdominal distention c/w ascites, like 2/2 malignant effusion   - per pt's daughter, had paracentesis ~June 2020   - will consult IR for diagnostic + therapeutic paracentesis   - consider IV diuresis if worsening SOB, as ascites likely contributing to SOB  - nausea + vomiting also likely 2/2 ascites   - reglan 5 mg Q8H  - Zofran 4mg IV Q8H PRN Patient with abdominal distention c/w ascites, like 2/2 malignant effusion   - per pt's daughter, had paracentesis ~June 2020   - will consult IR for diagnostic + therapeutic paracentesis   - will obtain CTAP  - consider IV diuresis if worsening SOB, as ascites likely contributing to SOB  - nausea + vomiting also likely 2/2 ascites   - reglan 5 mg Q8H  - Zofran 4mg IV Q8H PRN Patient with abdominal distention c/w ascites, like 2/2 malignant effusion   - per pt's daughter, had paracentesis ~June 2020   - will consult IR for diagnostic + therapeutic paracentesis   - will obtain CTAP  - consider IV diuresis if worsening SOB, as ascites likely contributing to SOB  - nausea + vomiting also likely 2/2 ascites  - reglan 5 mg Q8H  - Zofran 4mg IV Q8H PRN  - if continued vomiting, urgent CTAP to assess for obstruction  - otherwise, CTAP in AM, also r/o SBP

## 2020-08-19 NOTE — H&P ADULT - PROBLEM SELECTOR PLAN 8
Transitions of Care Status:  1.  Name of PCP:  2.  PCP Contacted on Admission: [ ] Y    [x ] N  - evening admission, unclear who PCP is   3.  PCP contacted at Discharge: [ ] Y    [ ] N    [ ] N/A  4.  Post-Discharge Appointment Date and Location:  5.  Summary of Handoff given to PCP:

## 2020-08-19 NOTE — H&P ADULT - PROBLEM SELECTOR PLAN 3
Multifactorial. Likely 2/2 h/o COPD + ascites   - TTE shows trace pericardial effusion + small bilateral pleural effusions, likely due to ascities   - Continue supplemental nasal cannula oxygen with O2 sat goal >92  - continue home Symbicort Multifactorial. Likely 2/2 h/o COPD + ascites   - TTE shows trace pericardial effusion + small bilateral pleural effusions, likely due to ascities   - Continue supplemental nasal cannula oxygen with O2 sat goal >92  - continue home Symbicort  - duonebs PRN  - if worsening SOB, consider IV lasix + repeat CXR + volume status assessment

## 2020-08-19 NOTE — H&P ADULT - ATTENDING COMMENTS
64M Pmhx pancreatic cancer (dx'ed June 2020), COPD, presenting from nursing home with hypotension, hyponatremia and SOB with worsening hypoxia, likely 2/2 ascites and volume overload from advanced pancreatic cancer  VItals, physical exam and lab results as above (reviewed).   PLAN  1) acute on chronic hypoxic respiratory failure- pt on 4L O2 in ED with baseline 2L but per pt recently was increased to 3L at rehab facility. Likely multifactorial and 2/2 large ascites/abdominal pressure compressing on thoracic cavity, pleural effusions, copd. Pt will benefit from large volume paracentesis for both diagnostic and therapeutic purposes. Cont symbicort, duonebs prn for copd. Lasix prn as tolerable. incentive yonatan. OOB to chair, PT. Nasal cannula for supportive care.  2) ascites- suspect 2/2 pancreatic cancer, malignancy. However no prior hx or imaging on patient. Will obtain abdominal ultrasound. Get recent CT imaging in AM from [facility], however may benefit to repeat here. IR cs in AM for paracentesis.   3) pancreatic ca- oncology cs. obtain records in AM. diagnosis was recent and pt never began treatment. currently pt appears very cachectic and deconditioned and likely would not have the performance status to tolerate chemotherapy.  3) renal insufficiency- unclear if acute or chronic kidney injury as no baseline. will obtain renal sono, bladder scan to r/o retention in setting of ascites, UA, urine lytes. Trend renal fx and renally dose all meds. Encourage PO fluid for now, avoid IVF unless vomiting continues given diffuse anasarca.   4) hyponatremia-likely multifactorial 2/2  hypervolemia from underlying malignancy, ascites, but also in setting of N/V. CHeck urine lytes. Restrict free water for now to 1.2L and encourage PO/solute intake. NUtrition cs. Add ensure to diet. Presumed chronic hyponatremia so careful repletion with only 6-8meq improvement in first 24 hours.  5) elevated troponins-likely in setting of hypotension, renal insufficiency. no symptoms suggestive of ACS or ischemia on EKG. Will not trend further.  need to obtain collateral as to why pt on eliquis  6)LE wounds-wrapped in new dressing from today. would obtain woundcare cs 64M Pmhx pancreatic cancer (dx'ed June 2020), COPD, presenting from nursing home with hypotension, hyponatremia and SOB with worsening hypoxia, likely 2/2 ascites and volume overload from advanced pancreatic cancer  VItals, physical exam and lab results as above (reviewed).   PLAN  1) acute on chronic hypoxic respiratory failure- pt on 4L O2 in ED with baseline 2L but per pt recently was increased to 3L at rehab facility. Likely multifactorial and 2/2 large ascites/abdominal pressure compressing on thoracic cavity, pleural effusions, copd. Pt will benefit from large volume paracentesis for both diagnostic and therapeutic purposes. Cont symbicort, duonebs prn for copd. Lasix prn as tolerable. incentive yonatan. OOB to chair, PT. Nasal cannula for supportive care.  2) ascites- suspect 2/2 pancreatic cancer, malignancy. However no prior hx or imaging on patient. Will obtain abdominal ultrasound. Get recent CT imaging in AM from [facility], however may benefit to repeat here. IR cs in AM for paracentesis.   3) pancreatic ca- oncology cs. obtain records in AM. diagnosis was recent and pt never began treatment. currently pt appears very cachectic and deconditioned and likely would not have the performance status to tolerate chemotherapy.  3) renal insufficiency- unclear if acute or chronic kidney injury as no baseline. will obtain renal sono, bladder scan to r/o retention in setting of ascites, UA, urine lytes. Trend renal fx and renally dose all meds. Encourage PO fluid for now, avoid IVF unless vomiting continues given diffuse anasarca.   4) hyponatremia-likely multifactorial 2/2  hypervolemia from underlying malignancy, ascites, but also in setting of N/V. CHeck urine lytes. Restrict free water for now to 1.2L and encourage PO/solute intake. NUtrition cs. Add ensure to diet. Presumed chronic hyponatremia so careful repletion with only 6-8meq improvement in first 24 hours.  5) elevated troponins-likely in setting of hypotension, renal insufficiency. no symptoms suggestive of ACS or ischemia on EKG. Will not trend further.  need to obtain collateral as to why pt on eliquis  6)LE wounds-wrapped in new dressing from today. would obtain woundcare cs  7)hx of IVDU- continue suboxone as dosed (longstanding home med)

## 2020-08-19 NOTE — H&P ADULT - NSICDXPASTMEDICALHX_GEN_ALL_CORE_FT
PAST MEDICAL HISTORY:  Ascites     Drug abuse     Essential hypertension     Pancreatic cancer     Simple chronic bronchitis with home o2 PRN

## 2020-08-19 NOTE — H&P ADULT - NSHPREVIEWOFSYSTEMS_GEN_ALL_CORE
REVIEW OF SYSTEMS:    CONSTITUTIONAL: (+) weakness, no fevers or chills  EYES/ENT: No visual changes;  No vertigo or throat pain   NECK: No pain or stiffness  RESPIRATORY:  (+) shortness of breath. No cough, wheezing, hemoptysis  CARDIOVASCULAR: No chest pain or palpitations  GASTROINTESTINAL: (+) diffuse abdominal pain worse in RUQ.  (+)nausea and vomiting. No hematemesis; No diarrhea or constipation. No melena or hematochezia.  GENITOURINARY: No dysuria, frequency or hematuria  NEUROLOGICAL: No numbness or weakness  SKIN: diffuse areas of ecchymosis on arms, legs, and chest.

## 2020-08-19 NOTE — H&P ADULT - NSHPPHYSICALEXAM_GEN_ALL_CORE
PHYSICAL EXAM:  Vital Signs Last 24 Hrs  T(C): 36.2 (19 Aug 2020 20:38), Max: 37 (19 Aug 2020 14:42)  T(F): 97.2 (19 Aug 2020 20:38), Max: 98.6 (19 Aug 2020 14:42)  HR: 102 (19 Aug 2020 20:38) (91 - 104)  BP: 105/71 (19 Aug 2020 20:38) (100/67 - 115/70)  BP(mean): --  RR: 18 (19 Aug 2020 20:38) (18 - 24)  SpO2: 98% (19 Aug 2020 20:38) (97% - 100%)    TELEMETRY:    CONSTITUTIONAL: Cachectic and ill-appearing   HEENT: PEERLA, no scleral icterus, no jaundice under tongue  RESPIRATORY: increased respiratory effort. long pauses between words to catch breath. shallow breathing.  lungs are clear to auscultation bilaterally. no wheezing  CARDIOVASCULAR: Regular rate and rhythm, normal S1 and S2, no murmur/rub/gallop; Has lower extremity edema  ABDOMEN: Distended with tight skin. Ascites. Diffuse tenderness to palpation, normoactive bowel sounds, no rebound/guarding; No hepatosplenomegaly  MSK/SKIN: areas of echymossis diffusely distributed on body, including arms, legs, and chest.   NEURO/PSYCH: mostly A+O x3, but slow to answer and at first certain answers didn't make sense when describing place. No asterixis.

## 2020-08-20 NOTE — CONSULT NOTE ADULT - ASSESSMENT
Patient is a 65 y/o M w/ a PMHx of recently diagnosed pancreatic cancer and COPD who was sent to the ED from a NH with hypotension, hyponatremia, and SOB, found to have worsening hypoxia in the setting of abdominal ascites, hyponatremia, and ANNMARIE vs CKD, and was admitted to Medicine for further management.      *** NOTE INCOMPLETE *** Patient is a 63 y/o M w/ a PMHx of recently diagnosed pancreatic cancer and COPD who was sent to the ED from a NH with hypotension, hyponatremia, and SOB, found to have worsening hypoxia in the setting of abdominal ascites, hyponatremia, and ANNMARIE vs CKD, and was admitted to Medicine for further management.    # Dyspnea  - Likely 2/2 worsening abdominal ascites in the setting of malignancy and cirrhosis as well as underlying COPD. Per d/w patient's daughter, patient had a paracentesis ~ 3 weeks ago at [facility]   - Abdominal U/S (8/20) was notable for liver cirrhosis w/ portal hypertension and moderate volume ascites  - Recommend IR consult for therapeutic paracentesis  - C/w Duonebs PRN and Symbicort  - Wean supplemental O2 as tolerated    # Pancreatic cancer  - Recently Dx in 6/2020 per chart review. Per d/w patient's daughter, patient was admitted to [facility] in 6/2020 for jaundice, found to have a mass that was blocking his bile duct s/p stent placement and biopsy. She is unsure what this biopsy showed  - Recommend obtaining records from [facility] to help clarify history  - Agree with checking CT C/A/P  - Given patient's declining functional status, poor nutrition, and recurrent ascites, he would not be a candidate for DMT at this time. Recommend Palliative Care consult for further assistance.  - Appreciate care as per primary team  - Will continue to follow    TO BE D/W ATTENDING    Kyle Razo, PGY5  Hematology-Oncology Fellow  Pager: [phone] / [phone] Patient is a 65 y/o M w/ a PMHx of recently diagnosed pancreatic cancer and COPD who was sent to the ED from a NH with hypotension, hyponatremia, and SOB, found to have worsening hypoxia in the setting of abdominal ascites, hyponatremia, and ANNMARIE vs CKD, and was admitted to Medicine for further management.    # Dyspnea  - Likely 2/2 worsening abdominal ascites in the setting of malignancy and cirrhosis as well as underlying COPD. Per d/w patient's daughter, patient had a paracentesis ~ 3 weeks ago at [facility]   - Abdominal U/S (8/20) was notable for liver cirrhosis w/ portal hypertension and moderate volume ascites  - Recommend IR consult for therapeutic paracentesis  - C/w Duonebs PRN and Symbicort  - Wean supplemental O2 as tolerated    # Pancreatic cancer  - Recently Dx in 6/2020 per chart review. Per d/w patient's daughter, patient was admitted to [facility] in 6/2020 for jaundice, found to have a mass that was blocking his bile duct s/p stent placement and biopsy. She is unsure what this biopsy showed  - Recommend obtaining records from [facility] to help clarify history  - Agree with checking CT C/A/P  - Given patient's declining functional status, poor nutrition, and recurrent ascites, he would not be a candidate for DMT at this time. Recommend Palliative Care consult for further assistance.  - Appreciate care as per primary team  - Will continue to follow    Kyle Razo, PGY5  Hematology-Oncology Fellow  Pager: [phone] / [phone]

## 2020-08-20 NOTE — PROGRESS NOTE ADULT - SUBJECTIVE AND OBJECTIVE BOX
Interventional Radiology Brief Post Procedure Note    Procedure: Paracentesis    Operators: Alberto Munoz MD    Anesthesia (type): Local lidocaine.    Contrast: None.    EBL: Minimal.    Findings/Follow up Plan of Care: Successful paracentesis yielding 2750 ml jesusita fluid.    Specimens Removed: 2750 mL ascites    Implants: None.     Complications: No immediate complications.     Condition/Disposition: Back to inpatient room.     Please call Interventional Radiology [phone] with any questions, concerns, or issues.

## 2020-08-20 NOTE — PROGRESS NOTE ADULT - PROBLEM SELECTOR PLAN 8
Diet: DASH/TLC  DVT: apixaban 2.5 mg Q12H (per outside records, appears he may be taking for suspected IVC thrombus)  CODE: full code

## 2020-08-20 NOTE — PROGRESS NOTE ADULT - SUBJECTIVE AND OBJECTIVE BOX
Vascular & Interventional Radiology Pre-Procedure Note    Procedure Name: Paracentesis    HPI: 64y Male with ascites. Diagnostic and therapeutic paracentesis is requested.    Allergies:   Medications (Abx/Cardiac/Anticoagulation/Blood Products)  amoxicillin  875 milliGRAM(s)/clavulanate: 1 Tablet(s) Oral (08-20 @ 11:24)  apixaban: 2.5 milliGRAM(s) Oral (08-20 @ 05:26)    Data:  177.8  62  T(C): 36.6  HR: 103  BP: 98/59  RR: 20  SpO2: 100%    -WBC 13.41 / HgB 10.2 / Hct 31.4 / Plt 223  -Na 125 / Cl 89 / BUN 89 / Glucose 208  -K 5.7 / CO2 17 / Cr 1.88  -ALT 23 / Alk Phos 167 / T.Bili 1.0  -INR1.63    Plan:   -64y Male presents for paracentesis. Procedure and risks discussed with patient and he is agreeable to proceed.   -Risks/Benefits/alternatives explained with the patient and/or healthcare proxy and witnessed informed consent obtained.

## 2020-08-20 NOTE — PHYSICAL THERAPY INITIAL EVALUATION ADULT - PERTINENT HX OF CURRENT PROBLEM, REHAB EVAL
64M Pmhx pancreatic cancer (dx'ed June 2020), COPD, presenting from nursing home with hypotension, hyponatremia and SOB. CXR 8/19, small Lt pleural effusion.

## 2020-08-20 NOTE — CONSULT NOTE ADULT - ASSESSMENT
64M Pmhx pancreatic cancer (dx'ed June 2020), COPD here with multifocal pneumonia currently on augmentin, large volume ascites s/p 3L therapeutic tap by IR without evidence of SBP at this time, worsening renal function and hyponatremia currently not a candidate for oncologic or nephrologic intervention for chemotherapy or dialysis.     - would broaden patient's antibiotics to vancomycin, zosyn and azithromycin for treatment for multifocal PNA with possible HCAP given recent hospital stay in June, presence of ca, and nursing home stay.   - patient currently on midodrine for blood pressure support. Given large volume tap would consider giving 250cc 5% albumin to assess for response in BP.   - goals of care extensively discussed with patient. Full code at this time. In the event he is incapacitated and is unable to make decisions for himself, he would defer to his daughter Leighann Sosa at [phone]  - patient's renal function is worsening with decompressed bladder, possibly from ANNMARIE/CKD, hepatorenal syndrome or obstructive uropathy from identified b/l hydro on CT. If patient not a candidate for dialysis, would consider involving IR for possible nephrostomy in the interest of improving patient's renal function and improving quality of life if that is what he desires.   - f/u nephrology and oncology recs  - patient currently not a MICU candidate at this time due to current hemodynamic stability, and patient not being a candidate for dialysis or any salvage therapy at this point that would require intensive monitoring  - please reconsult MICU as needed if patient decompensates or patient's status changes. 64M Pmhx pancreatic cancer (dx'ed June 2020), COPD here with multifocal pneumonia currently on augmentin, large volume ascites s/p 3L therapeutic tap by IR without evidence of SBP at this time, worsening renal function and hyponatremia currently not a candidate for oncologic or nephrologic intervention for chemotherapy or dialysis.     - would broaden patient's antibiotics to vancomycin, zosyn and azithromycin for treatment for multifocal PNA with possible HCAP given consolidation on CT and recent hospital stay in June, presence of ca, and nursing home stay.   - patient currently on midodrine for blood pressure support. Given large volume tap would consider giving 100cc 25% albumin to assess for response in BP.   - goals of care extensively discussed with patient. Full code at this time. In the event he is incapacitated and is unable to make decisions for himself, he would defer to his daughter Leighann Sosa at [phone]  - patient's renal function is worsening with decompressed bladder, possibly from ANNMARIE/CKD, hepatorenal syndrome or obstructive uropathy from identified b/l hydro on CT. If patient not a candidate for dialysis, would consider involving IR for possible nephrostomy in the interest of improving patient's renal function and improving quality of life if that is what he desires.   - f/u nephrology and oncology recs  - patient currently not a MICU candidate at this time due to current hemodynamic stability, and patient not being a candidate for dialysis or any salvage therapy at this point that would require intensive monitoring  - please reconsult MICU as needed if patient decompensates or patient's status changes.

## 2020-08-20 NOTE — CONSULT NOTE ADULT - SUBJECTIVE AND OBJECTIVE BOX
CHIEF COMPLAINT: "I was brought here by ambulance"    HPI:  64M Pmhx pancreatic cancer (dx'ed 2020), COPD, presenting from nursing home with hypotension, hyponatremia and SOB.     Patient was originally diagnosed with pancreatic cancer in 2020 during a hospital stay at [facility] and was subsequently discharged to nursing facility without any treatment. Patient BIBEMS from nursing home after found to be hypotensive to 90's systolic. Patient states that he was brought in in line with his daughter's wishes that he be taken out of the nursing home where she was concerned he was not getting the care that he required.     During his hospital stay, patient has been identified to have hyponatremia that has been worsening to 122, has been having worsening renal function in the setting of a rising Cr to 1.99, BUN 92, HCO3 <10, K 5.9, and pH of 7.25. Patient has been complaining of nausea, vomiting and increasing abdominal distention. Patient received a CTAP that identified large ascites, multifocal PNA, bilateral hydronephrosis in the setting of a decompressed bladder.     Patient has been evaluated by nephrology and oncology and deemed not a candidate for renal replacement therapy nor disease modifying therapy at this time. MICU consulted for worsening electrolyte abnormalities.     PAST MEDICAL & SURGICAL HISTORY:  Ascites  Pancreatic cancer  Simple chronic bronchitis: with home o2 PRN  Essential hypertension  Drug abuse  S/P hip replacement, right      FAMILY HISTORY:  Family history of early CAD      SOCIAL HISTORY:  Smoking: [x] Never Smoked [ ] Former Smoker (__ packs x ___ years) [ ] Current Smoker  (__ packs x ___ years)  Marital Status: [ ] Single [ ]  [ ]  [ ]     Allergies    No Known Allergies    Intolerances        HOME MEDICATIONS:    REVIEW OF SYSTEMS:  Constitutional: [ ] fevers [ ] chills [ ] weight loss [ ] weight gain  HEENT: [ ] dry eyes [ ] eye irritation [ ] postnasal drip [ ] nasal congestion  CV: [ ] chest pain [ ] orthopnea [ ] palpitations [ ] murmur  Resp: [ ] cough [ ] shortness of breath [ ] dyspnea [ ] wheezing [ ] sputum [ ] hemoptysis  GI: [ ] nausea [ ] vomiting [ ] diarrhea [ ] constipation [x] abd pain [ ] dysphagia   : [ ] dysuria [ ] nocturia [ ] hematuria [ ] increased urinary frequency  Musculoskeletal: [ ] back pain [ ] myalgias [ ] arthralgias [ ] fracture  Skin: [ ] rash [ ] itch  Neurological: [ ] headache [ ] dizziness [ ] syncope [ ] weakness [ ] numbness  Psychiatric: [ ] anxiety [ ] depression  Endocrine: [ ] diabetes [ ] thyroid problem  Hematologic/Lymphatic: [ ] anemia [ ] bleeding problem  Allergic/Immunologic: [ ] itchy eyes [ ] nasal discharge [ ] hives [ ] angioedema  [x] All other systems negative  [ ] Unable to assess ROS because ________    OBJECTIVE:  ICU Vital Signs Last 24 Hrs  T(C): 36.4 (20 Aug 2020 21:30), Max: 37.1 (20 Aug 2020 18:18)  T(F): 97.6 (20 Aug 2020 21:30), Max: 98.8 (20 Aug 2020 18:18)  HR: 99 (20 Aug 2020 21:30) (10 - 104)  BP: 102/60 (20 Aug 2020 21:30) (83/45 - 105/65)  BP(mean): 66 (20 Aug 2020 18:59) (66 - 66)  ABP: --  ABP(mean): --  RR: 20 (20 Aug 2020 21:30) (20 - 21)  SpO2: 100% (20 Aug 2020 21:30) (94% - 100%)        08-19 @ 07:  -  -20 @ 07:00  --------------------------------------------------------  IN: 0 mL / OUT: 75 mL / NET: -75 mL    08 @ 07:  -   @ 21:48  --------------------------------------------------------  IN: 0 mL / OUT: 150 mL / NET: -150 mL      CAPILLARY BLOOD GLUCOSE      POCT Blood Glucose.: 217 mg/dL (20 Aug 2020 12:30)      PHYSICAL EXAM:    GENERAL: Cachectic, slightly slow to respond.   HEENT:  Atraumatic, Normocephalic  CHEST/LUNG: Chest rise equal bilaterally. Coarse breath sounds bilaterally.   HEART: Regular rate and rhythm. No murmurs or rubs.   ABDOMEN: Soft, Nontender, mildly distended.   EXTREMITIES:  2+ Peripheral Pulses.  PSYCH: A&Ox3  SKIN: Erythematous lesion on upper anterior chest appearing like an old hematoma.       LINES:     HOSPITAL MEDICATIONS:  MEDICATIONS  (STANDING):  amoxicillin  875 milliGRAM(s)/clavulanate 1 Tablet(s) Oral every 12 hours  apixaban 2.5 milliGRAM(s) Oral every 12 hours  budesonide  80 MICROgram(s)/formoterol 4.5 MICROgram(s) Inhaler 2 Puff(s) Inhalation two times a day  buprenorphine 8 mG/naloxone 2 mG SL  Tablet 1 Tablet(s) SubLingual <User Schedule>  metoclopramide 5 milliGRAM(s) Oral three times a day  pantoprazole    Tablet 40 milliGRAM(s) Oral before breakfast  silver sulfADIAZINE 1% Cream 1 Application(s) Topical two times a day  sodium bicarbonate  Infusion 0.242 mEq/kG/Hr (100 mL/Hr) IV Continuous <Continuous>  sodium zirconium cyclosilicate 10 Gram(s) Oral every 8 hours    MEDICATIONS  (PRN):  acetaminophen   Tablet .. 1000 milliGRAM(s) Oral every 6 hours PRN Temp greater or equal to 38C (100.4F), Moderate Pain (4 - 6)  albuterol/ipratropium for Nebulization 3 milliLiter(s) Nebulizer every 6 hours PRN Shortness of Breath and/or Wheezing  magnesium hydroxide Suspension 30 milliLiter(s) Oral daily PRN Constipation  midodrine 20 milliGRAM(s) Oral every 8 hours PRN Hypotension  ondansetron Injectable 4 milliGRAM(s) IV Push every 8 hours PRN Nausea and/or Vomiting  simethicone 80 milliGRAM(s) Chew every 8 hours PRN Gas      LABS:                        10.2   13.41 )-----------( 223      ( 20 Aug 2020 09:27 )             31.4     Hgb Trend: 10.2<--, 10.8<--  08-20    122<L>  |  91<L>  |  92<H>  ----------------------------<  146<H>  5.9<H>   |  <10<LL>  |  1.99<H>    Ca    8.8      20 Aug 2020 16:25  Phos  4.6       Mg     2.8         TPro  5.8<L>  /  Alb  3.1<L>  /  TBili  1.0  /  DBili  x   /  AST  23  /  ALT  23  /  AlkPhos  167<H>      Creatinine Trend: 1.99<--, 1.88<--, 1.73<--, 1.71<--  PT/INR - ( 20 Aug 2020 12:27 )   PT: 19.0 sec;   INR: 1.63 ratio         PTT - ( 20 Aug 2020 12:27 )  PTT:31.4 sec  Urinalysis Basic - ( 20 Aug 2020 16:52 )    Color: Light Yellow / Appearance: Slightly Turbid / S.005 / pH: x  Gluc: x / Ketone: Negative  / Bili: Negative / Urobili: Negative   Blood: x / Protein: Negative / Nitrite: Negative   Leuk Esterase: Negative / RBC: 10 /hpf / WBC 0 /HPF   Sq Epi: x / Non Sq Epi: 0 /hpf / Bacteria: Negative        Venous Blood Gas:   @ 08:53  7.25/48/29//48  VBG Lactate: 2.3  Venous Blood Gas:   @ 16:26  --/--/--/--/--  VBG Lactate: 2.4  Venous Blood Gas:   @ 14:45  7.28/48/29//48  VBG Lactate: 2.6      RADIOLOGY:  [x] Reviewed and interpreted by me    CTAP;   IMPRESSION:    Secretions within the tracheobronchial tree as described above. Findings most consistent with multifocal pneumonia. Trace left pleural fluid.    Prominent colon. No small bowel dilatation. Wall thickening distal sigmoid colon. Consider bedside rectosigmoidoscopy. Differential diagnosis is given above.    Large volume ascites.    Moderate right hydroureteronephrosis and mild left hydronephrosis better characterized at prior same day ultrasound.    Patent biliary stent.              JAYA LE M.D., RADIOLOGY RESIDENT  This document has been electronically signed.  DANIELE RODRIGUEZ M.D., ATTENDING RADIOLOGIST  This document has been electronically signed. Aug 20 2020  3:39PM CHIEF COMPLAINT: "I was brought here by ambulance"    HPI:  64M Pmhx pancreatic cancer (dx'ed 2020), COPD, presenting from nursing home with hypotension, hyponatremia and SOB.     Patient was originally diagnosed with pancreatic cancer in 2020 during a hospital stay at [facility] and was subsequently discharged to nursing facility without any treatment. Patient BIBEMS from nursing home after found to be hypotensive to 90's systolic. Patient states that he was brought in in line with his daughter's wishes that he be taken out of the nursing home where she was concerned he was not getting the care that he required.     During his hospital stay, patient has been identified to have hyponatremia that has been worsening to 122, has been having worsening renal function in the setting of a rising Cr to 1.99, BUN 92, HCO3 <10, K 5.9, and pH of 7.25. Patient has been complaining of nausea, vomiting and increasing abdominal distention. Patient received a CTAP that identified large ascites, multifocal PNA, bilateral hydronephrosis in the setting of a decompressed bladder.     Patient has been evaluated by nephrology and oncology and deemed not a candidate for renal replacement therapy nor disease modifying therapy at this time. MICU consulted for worsening electrolyte abnormalities.     PAST MEDICAL & SURGICAL HISTORY:  Ascites  Pancreatic cancer  Simple chronic bronchitis: with home o2 PRN  Essential hypertension  Drug abuse  S/P hip replacement, right      FAMILY HISTORY:  Family history of early CAD      SOCIAL HISTORY:  Smoking: [x] Never Smoked [ ] Former Smoker (__ packs x ___ years) [ ] Current Smoker  (__ packs x ___ years)  Marital Status: [ ] Single [ ]  [ ]  [ ]     Allergies  No Known Allergies    REVIEW OF SYSTEMS:  Constitutional: [ ] fevers [ ] chills [ ] weight loss [ ] weight gain  HEENT: [ ] dry eyes [ ] eye irritation [ ] postnasal drip [ ] nasal congestion  CV: [ ] chest pain [ ] orthopnea [ ] palpitations [ ] murmur  Resp: [ ] cough [ ] shortness of breath [ ] dyspnea [ ] wheezing [ ] sputum [ ] hemoptysis  GI: [ ] nausea [ ] vomiting [ ] diarrhea [ ] constipation [x] abd pain [ ] dysphagia   : [ ] dysuria [ ] nocturia [ ] hematuria [ ] increased urinary frequency  Musculoskeletal: [ ] back pain [ ] myalgias [ ] arthralgias [ ] fracture  Skin: [ ] rash [ ] itch  Neurological: [ ] headache [ ] dizziness [ ] syncope [ ] weakness [ ] numbness  Psychiatric: [ ] anxiety [ ] depression  Endocrine: [ ] diabetes [ ] thyroid problem  Hematologic/Lymphatic: [ ] anemia [ ] bleeding problem  Allergic/Immunologic: [ ] itchy eyes [ ] nasal discharge [ ] hives [ ] angioedema  [x] All other systems negative  [ ] Unable to assess ROS because ________    OBJECTIVE:  ICU Vital Signs Last 24 Hrs  T(C): 36.4 (20 Aug 2020 21:30), Max: 37.1 (20 Aug 2020 18:18)  T(F): 97.6 (20 Aug 2020 21:30), Max: 98.8 (20 Aug 2020 18:18)  HR: 99 (20 Aug 2020 21:30) (10 - 104)  BP: 102/60 (20 Aug 2020 21:30) (83/45 - 105/65)  BP(mean): 66 (20 Aug 2020 18:59) (66 - 66)  RR: 20 (20 Aug 2020 21:30) (20 - 21)  SpO2: 100% (20 Aug 2020 21:30) (94% - 100%)        0819 @ 07:  -  -20 @ 07:00  --------------------------------------------------------  IN: 0 mL / OUT: 75 mL / NET: -75 mL     @ 07:01  -   @ 21:48  --------------------------------------------------------  IN: 0 mL / OUT: 150 mL / NET: -150 mL      CAPILLARY BLOOD GLUCOSE  POCT Blood Glucose.: 217 mg/dL (20 Aug 2020 12:30)      PHYSICAL EXAM:    GENERAL: Cachectic, slightly slow to respond.   HEENT:  Atraumatic, Normocephalic  CHEST/LUNG: Chest rise equal bilaterally. Coarse breath sounds bilaterally.   HEART: Regular rate and rhythm. No murmurs or rubs.   ABDOMEN: Soft, Nontender, mildly distended.   EXTREMITIES:  2+ Peripheral Pulses.  PSYCH: A&Ox3  SKIN: Erythematous lesion on upper anterior chest appearing like an old hematoma.       LINES:     HOSPITAL MEDICATIONS:  MEDICATIONS  (STANDING):  amoxicillin  875 milliGRAM(s)/clavulanate 1 Tablet(s) Oral every 12 hours  apixaban 2.5 milliGRAM(s) Oral every 12 hours  budesonide  80 MICROgram(s)/formoterol 4.5 MICROgram(s) Inhaler 2 Puff(s) Inhalation two times a day  buprenorphine 8 mG/naloxone 2 mG SL  Tablet 1 Tablet(s) SubLingual <User Schedule>  metoclopramide 5 milliGRAM(s) Oral three times a day  pantoprazole    Tablet 40 milliGRAM(s) Oral before breakfast  silver sulfADIAZINE 1% Cream 1 Application(s) Topical two times a day  sodium bicarbonate  Infusion 0.242 mEq/kG/Hr (100 mL/Hr) IV Continuous <Continuous>  sodium zirconium cyclosilicate 10 Gram(s) Oral every 8 hours    MEDICATIONS  (PRN):  acetaminophen   Tablet .. 1000 milliGRAM(s) Oral every 6 hours PRN Temp greater or equal to 38C (100.4F), Moderate Pain (4 - 6)  albuterol/ipratropium for Nebulization 3 milliLiter(s) Nebulizer every 6 hours PRN Shortness of Breath and/or Wheezing  magnesium hydroxide Suspension 30 milliLiter(s) Oral daily PRN Constipation  midodrine 20 milliGRAM(s) Oral every 8 hours PRN Hypotension  ondansetron Injectable 4 milliGRAM(s) IV Push every 8 hours PRN Nausea and/or Vomiting  simethicone 80 milliGRAM(s) Chew every 8 hours PRN Gas      LABS:                        10.2   13.41 )-----------( 223      ( 20 Aug 2020 09:27 )             31.4     Hgb Trend: 10.2<--, 10.8<--  08-20    122<L>  |  91<L>  |  92<H>  ----------------------------<  146<H>  5.9<H>   |  <10<LL>  |  1.99<H>    Ca    8.8      20 Aug 2020 16:25  Phos  4.6     0820  Mg     2.8     20    TPro  5.8<L>  /  Alb  3.1<L>  /  TBili  1.0  /  DBili  x   /  AST  23  /  ALT  23  /  AlkPhos  167<H>      Creatinine Trend: 1.99<--, 1.88<--, 1.73<--, 1.71<--  PT/INR - ( 20 Aug 2020 12:27 )   PT: 19.0 sec;   INR: 1.63 ratio         PTT - ( 20 Aug 2020 12:27 )  PTT:31.4 sec  Urinalysis Basic - ( 20 Aug 2020 16:52 )    Color: Light Yellow / Appearance: Slightly Turbid / S.005 / pH: x  Gluc: x / Ketone: Negative  / Bili: Negative / Urobili: Negative   Blood: x / Protein: Negative / Nitrite: Negative   Leuk Esterase: Negative / RBC: 10 /hpf / WBC 0 /HPF   Sq Epi: x / Non Sq Epi: 0 /hpf / Bacteria: Negative        Venous Blood Gas:   @ 08:53  7./48//48  VBG Lactate: 2.3  Venous Blood Gas:   @ 16:26  --/--/--/--/--  VBG Lactate: 2.4  Venous Blood Gas:   @ 14:45  7./48//  VBG Lactate: 2.6      RADIOLOGY:  [x] Reviewed and interpreted by me    CTAP;   IMPRESSION:    Secretions within the tracheobronchial tree as described above. Findings most consistent with multifocal pneumonia. Trace left pleural fluid.    Prominent colon. No small bowel dilatation. Wall thickening distal sigmoid colon. Consider bedside rectosigmoidoscopy. Differential diagnosis is given above.    Large volume ascites.    Moderate right hydroureteronephrosis and mild left hydronephrosis better characterized at prior same day ultrasound.    Patent biliary stent.

## 2020-08-20 NOTE — CONSULT NOTE ADULT - ASSESSMENT
ASSESSMENT:  64M Pmhx pancreatic cancer (dx'ed June 2020, S/p EGD EUS Stent placement in June 2020 and Paracentesis at [facility] August 2020), COPD, presenting from nursing home with hypotension, hyponatremia and SOB n/w pneumobilia and abdominal distension. This is likely related to previous ERCP and stent placement.     PLAN:    - To be discussed with surgical oncology attending  - CT final read pending   - No acute surgical intervention at this time  - Full plan to follow    [id number] ASSESSMENT:  64M Pmhx pancreatic cancer (dx'ed June 2020, S/p EGD EUS Stent placement in June 2020 and Paracentesis at [facility] August 2020), COPD, presenting from nursing home with hypotension, hyponatremia and SOB n/w pneumobilia and abdominal distension. This is likely related to previous ERCP and stent placement.     PLAN:    - Discussed with Dr. Blood  - CT final read pneumobilia with patent ERCP stent, no acute intraabdominal pathology  - No acute surgical intervention at this time  - Recommend Triple phase Pancreas Protocol CT or MRCP when able and ANNMARIE resolved for detailed imaging of pancreatic mass  - Will continue to follow     [id number]

## 2020-08-20 NOTE — PROGRESS NOTE ADULT - PROBLEM SELECTOR PLAN 5
Patient with Na 125 on repeat labs. Likely multifactorial, 2/2 hypervolemic hyponatremia in s/o ascites + hyponatremia from malignancy. Unclear if patient was vomiting prior to admission, but could also be contributing.   - urine studies pending   - 1200 ml fluid restriction  - dose of albumin to improve intravascular volume status

## 2020-08-20 NOTE — PROGRESS NOTE ADULT - SUBJECTIVE AND OBJECTIVE BOX
PROGRESS NOTE:   ************************************************  Authored by: Mitchell Gordon MD PGY1  Pager: [phone]  *************************************************    Patient is a 64y old  Male who presents with a chief complaint of Hyponatremia + SOB (19 Aug 2020 19:54)      SUBJECTIVE / OVERNIGHT EVENTS: Overnight, repeat Na remained stable at 125. Patient with continued episodes of small volume bilious emesis. The patient was seen and examined at bedside. Was in distress from back pain, and mostly uncooperative with exam. Was repositioned to chair with some relief. Endorses continued nausea + vomiting and SOB. PROGRESS NOTE:   ************************************************  Authored by: Mitchell Gordon MD PGY1  Pager: [phone]  *************************************************    Patient is a 64y old  Male who presents with a chief complaint of Hyponatremia + SOB (19 Aug 2020 19:54)      SUBJECTIVE / OVERNIGHT EVENTS: Overnight, repeat Na remained stable at 125. Patient with continued episodes of small volume bilious emesis. The patient was seen and examined at bedside. Was in distress from back pain, and mostly uncooperative with exam. Was repositioned to chair with some relief. Endorses continued nausea + vomiting and SOB.     Patient with continued soft blood pressures, worsening ANNMARIE with hyperkalemia, continued hyponatremia. Had abdominal u/s that showed pneumobilia, for which surgery is now consulted. PROGRESS NOTE:   ************************************************  Authored by: Mitchell Gordon MD PGY1  Pager: [phone]  *************************************************    SUBJECTIVE / OVERNIGHT EVENTS: Overnight, repeat Na remained stable at 125. Patient with continued episodes of small volume bilious emesis. The patient was seen and examined at bedside. Was in distress from back pain, and mostly uncooperative with exam. Was repositioned to chair with some relief. Endorses continued nausea + vomiting and SOB.     Patient with continued soft blood pressures, worsening ANNMARIE with hyperkalemia, continued hyponatremia. Had abdominal u/s that showed pneumobilia, for which surgery is now consulted.       REVIEW OF SYSTEMS:    CONSTITUTIONAL: No weakness, fevers or chills  EYES/ENT: No visual changes;  No vertigo or throat pain   NECK: No pain or stiffness  RESPIRATORY: No cough, wheezing, hemoptysis; No shortness of breath  CARDIOVASCULAR: No chest pain or palpitations  GASTROINTESTINAL: No abdominal or epigastric pain. No nausea, vomiting, or hematemesis; No diarrhea or constipation. No melena or hematochezia.  GENITOURINARY: No dysuria, frequency or hematuria  NEUROLOGICAL: No numbness or weakness  SKIN: No itching, rashes    MEDICATIONS  (STANDING):  amoxicillin  875 milliGRAM(s)/clavulanate 1 Tablet(s) Oral every 12 hours  apixaban 2.5 milliGRAM(s) Oral every 12 hours  ascorbic acid 500 milliGRAM(s) Oral daily  budesonide  80 MICROgram(s)/formoterol 4.5 MICROgram(s) Inhaler 2 Puff(s) Inhalation two times a day  buprenorphine 8 mG/naloxone 2 mG SL  Tablet 1 Tablet(s) SubLingual <User Schedule>  metoclopramide 5 milliGRAM(s) Oral three times a day  pantoprazole    Tablet 40 milliGRAM(s) Oral before breakfast  silver sulfADIAZINE 1% Cream 1 Application(s) Topical two times a day    MEDICATIONS  (PRN):  acetaminophen   Tablet .. 1000 milliGRAM(s) Oral every 6 hours PRN Temp greater or equal to 38C (100.4F), Moderate Pain (4 - 6)  albuterol/ipratropium for Nebulization 3 milliLiter(s) Nebulizer every 6 hours PRN Shortness of Breath and/or Wheezing  magnesium hydroxide Suspension 30 milliLiter(s) Oral daily PRN Constipation  midodrine 10 milliGRAM(s) Oral every 8 hours PRN Hypotension  ondansetron Injectable 4 milliGRAM(s) IV Push every 8 hours PRN Nausea and/or Vomiting  simethicone 80 milliGRAM(s) Chew every 8 hours PRN Gas      CAPILLARY BLOOD GLUCOSE      POCT Blood Glucose.: 217 mg/dL (20 Aug 2020 12:30)  POCT Blood Glucose.: 213 mg/dL (19 Aug 2020 14:28)    I&O's Summary    19 Aug 2020 07:  -  20 Aug 2020 07:00  --------------------------------------------------------  IN: 0 mL / OUT: 75 mL / NET: -75 mL    20 Aug 2020 07:01  -  20 Aug 2020 14:27  --------------------------------------------------------  IN: 0 mL / OUT: 150 mL / NET: -150 mL        PHYSICAL EXAM:  Vital Signs Last 24 Hrs  T(C): 36.6 (20 Aug 2020 12:37), Max: 37 (19 Aug 2020 14:42)  T(F): 97.8 (20 Aug 2020 12:37), Max: 98.6 (19 Aug 2020 14:42)  HR: 103 (20 Aug 2020 12:37) (10 - 104)  BP: 93/55 (20 Aug 2020 12:37) (93/55 - 115/70)  BP(mean): --  RR: 20 (20 Aug 2020 12:37) (18 - 24)  SpO2: 100% (20 Aug 2020 12:37) (94% - 100%)    TELEMETRY:    CONSTITUTIONAL: NAD, well-developed  RESPIRATORY: Normal respiratory effort; lungs are clear to auscultation bilaterally  CARDIOVASCULAR: Regular rate and rhythm, normal S1 and S2, no murmur/rub/gallop; No lower extremity edema; Peripheral pulses are 2+ bilaterally  ABDOMEN: Nontender to palpation, normoactive bowel sounds, no rebound/guarding; No hepatosplenomegaly  MUSCLOSKELETAL: no clubbing or cyanosis of digits; no joint swelling or tenderness to palpation  PSYCH: A+O to person, place, and time; affect appropriate    LABS:                        10.2   13.41 )-----------( 223      ( 20 Aug 2020 09:27 )             31.4     08-20    125<L>  |  89<L>  |  89<H>  ----------------------------<  208<H>  5.7<H>   |  17<L>  |  1.88<H>    Ca    8.7      20 Aug 2020 09:27  Phos  5.1     08-20  Mg     2.7     08-20    TPro  5.8<L>  /  Alb  3.1<L>  /  TBili  1.0  /  DBili  x   /  AST  23  /  ALT  23  /  AlkPhos  167<H>  08-20    PT/INR - ( 20 Aug 2020 12:27 )   PT: 19.0 sec;   INR: 1.63 ratio         PTT - ( 20 Aug 2020 12:27 )  PTT:31.4 sec      Urinalysis Basic - ( 19 Aug 2020 17:12 )    Color: Yellow / Appearance: Slightly Turbid / S.019 / pH: x  Gluc: x / Ketone: Negative  / Bili: Negative / Urobili: 3 mg/dL   Blood: x / Protein: 30 mg/dL / Nitrite: Negative   Leuk Esterase: Small / RBC: >50 /hpf / WBC 2 /HPF   Sq Epi: x / Non Sq Epi: 1 / Bacteria: Negative          RADIOLOGY & ADDITIONAL TESTS:  Results Reviewed:   Imaging Personally Reviewed:  Electrocardiogram Personally Reviewed:    COORDINATION OF CARE:  Care Discussed with Consultants/Other Providers [Y/N]:  Prior or Outpatient Records Reviewed [Y/N]: PROGRESS NOTE:   ************************************************  Authored by: Mitchell Gordon MD PGY1  Pager: [phone]  *************************************************    SUBJECTIVE / OVERNIGHT EVENTS: Overnight, repeat Na remained stable at 125. Patient with continued episodes of small volume bilious emesis. The patient was seen and examined at bedside. Was in distress from back pain, and mostly uncooperative with exam. Was repositioned to chair with some relief. Endorses continued nausea + vomiting and SOB.     Patient with continued soft blood pressures, worsening ANNMARIE with hyperkalemia, continued hyponatremia. Had abdominal u/s that showed pneumobilia, for which surgery is now consulted.     Outside hospital records received from hospitalization at [facility] from pt's admission 20-20, with creatinine 1.4, Na of 123 improving to 133, CT scan showing infiltrative pancreatic and biliary ductal dilitation, filling defect in IVC suspicious for thrombus, and liver in normal in size, contour and attenuation, and pneumobilia likely 2/2 instrumentation from CBD metallic stent.     CONSTITUTIONAL: No weakness, fevers or chills  EYES/ENT: No visual changes;  No vertigo or throat pain   NECK: No pain or stiffness  RESPIRATORY: No cough, wheezing, hemoptysis; No shortness of breath  CARDIOVASCULAR: No chest pain or palpitations  GASTROINTESTINAL: No abdominal or epigastric pain. No nausea, vomiting, or hematemesis; No diarrhea or constipation. No melena or hematochezia.  GENITOURINARY: No dysuria, frequency or hematuria  NEUROLOGICAL: No numbness or weakness  SKIN: No itching, rashes    MEDICATIONS  (STANDING):  amoxicillin  875 milliGRAM(s)/clavulanate 1 Tablet(s) Oral every 12 hours  apixaban 2.5 milliGRAM(s) Oral every 12 hours  ascorbic acid 500 milliGRAM(s) Oral daily  budesonide  80 MICROgram(s)/formoterol 4.5 MICROgram(s) Inhaler 2 Puff(s) Inhalation two times a day  buprenorphine 8 mG/naloxone 2 mG SL  Tablet 1 Tablet(s) SubLingual <User Schedule>  metoclopramide 5 milliGRAM(s) Oral three times a day  pantoprazole    Tablet 40 milliGRAM(s) Oral before breakfast  silver sulfADIAZINE 1% Cream 1 Application(s) Topical two times a day    MEDICATIONS  (PRN):  acetaminophen   Tablet .. 1000 milliGRAM(s) Oral every 6 hours PRN Temp greater or equal to 38C (100.4F), Moderate Pain (4 - 6)  albuterol/ipratropium for Nebulization 3 milliLiter(s) Nebulizer every 6 hours PRN Shortness of Breath and/or Wheezing  magnesium hydroxide Suspension 30 milliLiter(s) Oral daily PRN Constipation  midodrine 10 milliGRAM(s) Oral every 8 hours PRN Hypotension  ondansetron Injectable 4 milliGRAM(s) IV Push every 8 hours PRN Nausea and/or Vomiting  simethicone 80 milliGRAM(s) Chew every 8 hours PRN Gas      CAPILLARY BLOOD GLUCOSE      POCT Blood Glucose.: 217 mg/dL (20 Aug 2020 12:30)  POCT Blood Glucose.: 213 mg/dL (19 Aug 2020 14:28)    I&O's Summary    19 Aug 2020 07:01  -  20 Aug 2020 07:00  --------------------------------------------------------  IN: 0 mL / OUT: 75 mL / NET: -75 mL    20 Aug 2020 07:01  -  20 Aug 2020 14:27  --------------------------------------------------------  IN: 0 mL / OUT: 150 mL / NET: -150 mL        PHYSICAL EXAM:  Vital Signs Last 24 Hrs  T(C): 36.6 (20 Aug 2020 12:37), Max: 37 (19 Aug 2020 14:42)  T(F): 97.8 (20 Aug 2020 12:37), Max: 98.6 (19 Aug 2020 14:42)  HR: 103 (20 Aug 2020 12:37) (10 - 104)  BP: 93/55 (20 Aug 2020 12:37) (93/55 - 115/70)  BP(mean): --  RR: 20 (20 Aug 2020 12:37) (18 - 24)  SpO2: 100% (20 Aug 2020 12:37) (94% - 100%)    TELEMETRY:    CONSTITUTIONAL: ill-appearing  RESPIRATORY: shallow breaths, diffuse ronchi, no wheezing   CARDIOVASCULAR: Regular rate and rhythm, normal S1 and S2, no murmur/rub/gallop;   ABDOMEN: Distended with tight skin. Nontender to palpation, normoactive bowel sounds, no rebound/guarding;   Skin: lower legs - bandages on legs with prior weeping skin   PSYCH: A+O x2-3    LABS:                        10.2   13.41 )-----------( 223      ( 20 Aug 2020 09:27 )             31.4     08-20    125<L>  |  89<L>  |  89<H>  ----------------------------<  208<H>  5.7<H>   |  17<L>  |  1.88<H>    Ca    8.7      20 Aug 2020 09:27  Phos  5.1     08-20  Mg     2.7     08-20    TPro  5.8<L>  /  Alb  3.1<L>  /  TBili  1.0  /  DBili  x   /  AST  23  /  ALT  23  /  AlkPhos  167<H>  08-20    PT/INR - ( 20 Aug 2020 12:27 )   PT: 19.0 sec;   INR: 1.63 ratio         PTT - ( 20 Aug 2020 12:27 )  PTT:31.4 sec      Urinalysis Basic - ( 19 Aug 2020 17:12 )    Color: Yellow / Appearance: Slightly Turbid / S.019 / pH: x  Gluc: x / Ketone: Negative  / Bili: Negative / Urobili: 3 mg/dL   Blood: x / Protein: 30 mg/dL / Nitrite: Negative   Leuk Esterase: Small / RBC: >50 /hpf / WBC 2 /HPF   Sq Epi: x / Non Sq Epi: 1 / Bacteria: Negative          RADIOLOGY & ADDITIONAL TESTS:  Results Reviewed:   Imaging Personally Reviewed:  Electrocardiogram Personally Reviewed:    COORDINATION OF CARE:  Care Discussed with Consultants/Other Providers [Y/N]:  Prior or Outpatient Records Reviewed [Y/N]: PROGRESS NOTE:   ************************************************  Authored by: Mitchell Gordon MD PGY1  Pager: [phone]  *************************************************    SUBJECTIVE / OVERNIGHT EVENTS: Overnight, repeat Na remained stable at 125. Patient with continued episodes of small volume bilious emesis. The patient was seen and examined at bedside. Was in distress from back pain, and mostly uncooperative with exam. Was repositioned to chair with some relief. Endorses continued nausea + vomiting and SOB.     Patient with continued soft blood pressures, worsening ANNMARIE with hyperkalemia, continued hyponatremia. Had abdominal u/s that showed pneumobilia, for which surgery is now consulted.     Outside hospital records received from hospitalization at [facility] from pt's admission 20-20, with creatinine 1.4, Na of 123 improving to 133, CT scan showing infiltrative pancreatic and biliary ductal dilitation, filling defect in IVC suspicious for thrombus, and liver in normal in size, contour and attenuation, and pneumobilia likely 2/2 instrumentation from CBD metallic stent. Per documentation, patient 2 months prior to the August [facility] admission had FNA of pancreatic mass showing adenocarcinoma, and ERCP due to obstruction from pancreatic mass. He was advised to follow up with surgical oncologist, but never ended up making follow up appointment.     CONSTITUTIONAL: No weakness, fevers or chills  EYES/ENT: No visual changes;  No vertigo or throat pain   NECK: No pain or stiffness  RESPIRATORY: No cough, wheezing, hemoptysis; No shortness of breath  CARDIOVASCULAR: No chest pain or palpitations  GASTROINTESTINAL: No abdominal or epigastric pain. No nausea, vomiting, or hematemesis; No diarrhea or constipation. No melena or hematochezia.  GENITOURINARY: No dysuria, frequency or hematuria  NEUROLOGICAL: No numbness or weakness  SKIN: No itching, rashes    MEDICATIONS  (STANDING):  amoxicillin  875 milliGRAM(s)/clavulanate 1 Tablet(s) Oral every 12 hours  apixaban 2.5 milliGRAM(s) Oral every 12 hours  ascorbic acid 500 milliGRAM(s) Oral daily  budesonide  80 MICROgram(s)/formoterol 4.5 MICROgram(s) Inhaler 2 Puff(s) Inhalation two times a day  buprenorphine 8 mG/naloxone 2 mG SL  Tablet 1 Tablet(s) SubLingual <User Schedule>  metoclopramide 5 milliGRAM(s) Oral three times a day  pantoprazole    Tablet 40 milliGRAM(s) Oral before breakfast  silver sulfADIAZINE 1% Cream 1 Application(s) Topical two times a day    MEDICATIONS  (PRN):  acetaminophen   Tablet .. 1000 milliGRAM(s) Oral every 6 hours PRN Temp greater or equal to 38C (100.4F), Moderate Pain (4 - 6)  albuterol/ipratropium for Nebulization 3 milliLiter(s) Nebulizer every 6 hours PRN Shortness of Breath and/or Wheezing  magnesium hydroxide Suspension 30 milliLiter(s) Oral daily PRN Constipation  midodrine 10 milliGRAM(s) Oral every 8 hours PRN Hypotension  ondansetron Injectable 4 milliGRAM(s) IV Push every 8 hours PRN Nausea and/or Vomiting  simethicone 80 milliGRAM(s) Chew every 8 hours PRN Gas      CAPILLARY BLOOD GLUCOSE      POCT Blood Glucose.: 217 mg/dL (20 Aug 2020 12:30)  POCT Blood Glucose.: 213 mg/dL (19 Aug 2020 14:28)    I&O's Summary    19 Aug 2020 07:01  -  20 Aug 2020 07:00  --------------------------------------------------------  IN: 0 mL / OUT: 75 mL / NET: -75 mL    20 Aug 2020 07:01  -  20 Aug 2020 14:27  --------------------------------------------------------  IN: 0 mL / OUT: 150 mL / NET: -150 mL        PHYSICAL EXAM:  Vital Signs Last 24 Hrs  T(C): 36.6 (20 Aug 2020 12:37), Max: 37 (19 Aug 2020 14:42)  T(F): 97.8 (20 Aug 2020 12:37), Max: 98.6 (19 Aug 2020 14:42)  HR: 103 (20 Aug 2020 12:37) (10 - 104)  BP: 93/55 (20 Aug 2020 12:37) (93/55 - 115/70)  BP(mean): --  RR: 20 (20 Aug 2020 12:37) (18 - 24)  SpO2: 100% (20 Aug 2020 12:37) (94% - 100%)    TELEMETRY:    CONSTITUTIONAL: ill-appearing  RESPIRATORY: shallow breaths, diffuse ronchi, no wheezing   CARDIOVASCULAR: Regular rate and rhythm, normal S1 and S2, no murmur/rub/gallop;   ABDOMEN: Distended with tight skin. Nontender to palpation, normoactive bowel sounds, no rebound/guarding;   Skin: lower legs - bandages on legs with prior weeping skin   PSYCH: A+O x2-3    LABS:                        10.2   13.41 )-----------( 223      ( 20 Aug 2020 09:27 )             31.4     08-20    125<L>  |  89<L>  |  89<H>  ----------------------------<  208<H>  5.7<H>   |  17<L>  |  1.88<H>    Ca    8.7      20 Aug 2020 09:27  Phos  5.1     08-20  Mg     2.7     08-20    TPro  5.8<L>  /  Alb  3.1<L>  /  TBili  1.0  /  DBili  x   /  AST  23  /  ALT  23  /  AlkPhos  167<H>  08-20    PT/INR - ( 20 Aug 2020 12:27 )   PT: 19.0 sec;   INR: 1.63 ratio         PTT - ( 20 Aug 2020 12:27 )  PTT:31.4 sec      Urinalysis Basic - ( 19 Aug 2020 17:12 )    Color: Yellow / Appearance: Slightly Turbid / S.019 / pH: x  Gluc: x / Ketone: Negative  / Bili: Negative / Urobili: 3 mg/dL   Blood: x / Protein: 30 mg/dL / Nitrite: Negative   Leuk Esterase: Small / RBC: >50 /hpf / WBC 2 /HPF   Sq Epi: x / Non Sq Epi: 1 / Bacteria: Negative          RADIOLOGY & ADDITIONAL TESTS:  Results Reviewed:   Imaging Personally Reviewed:  Electrocardiogram Personally Reviewed:    COORDINATION OF CARE:  Care Discussed with Consultants/Other Providers [Y/N]:  Prior or Outpatient Records Reviewed [Y/N]:

## 2020-08-20 NOTE — PROGRESS NOTE ADULT - PROBLEM SELECTOR PLAN 2
Patient with abdominal distention c/w ascites, like 2/2 malignant effusion   - IR consulted - scheduled for diagnostic + therapeutic paracentesis 8/21  - pending CT chest, abdomen, pelvis   - giving dose of IV albumin 25 mg in 100 mg today to try to improve intravascular volume and prevent third spacing. consider IV diuresis if worsening SOB, as ascites likely contributing to SOB  - nausea + vomiting also likely 2/2 ascites  - reglan 5 mg Q8H  - Zofran 4mg IV Q8H PRN

## 2020-08-20 NOTE — CHART NOTE - NSCHARTNOTEFT_GEN_A_CORE
Wound Care team Note:    RN request for wound care consult for BLE wounds received. 2 attempts made today. Informed that patient is off the unit at this time and will be at multiple tests this afternoon. Will reattempt tomorrow.    Yari Mullen, NP-C, CWOCN [id number]

## 2020-08-20 NOTE — PROGRESS NOTE ADULT - PROBLEM SELECTOR PLAN 3
Patient with episodes of nausea + bilious emesis since admission, c/f obstruction   - pneumobilia found on abdominal ultrasound  - CT-chest, abdomen, pelvis pending  - surgery consulted

## 2020-08-20 NOTE — PROGRESS NOTE ADULT - PROBLEM SELECTOR PLAN 6
Worsening. Multifactorial, likely 2/2 obstruction from ascites, hypotension prior to admission could be contributing, and c/f hepatorenal syndrome  - will monitor BMP and obtain urine studies to further evaluate  - post-void bladder scan of 270, but difficult to measure due to ascites

## 2020-08-20 NOTE — PROGRESS NOTE ADULT - PROBLEM SELECTOR PLAN 1
Per patient + daughter, has pancreatic cancer that was diagnosed in June 2020. Records from early August 2020 hospitalization show infiltrative pancreatic and biliary ductal dilitation on CT scan  - Oncology consulted   - for now, supportive care as per below  - Patient currently with poor insight into illness. Current status is full code. Will continue goals of care discussion  - nutrition consult

## 2020-08-20 NOTE — CONSULT NOTE ADULT - SUBJECTIVE AND OBJECTIVE BOX
HPI:  Patient is a 65 y/o M w/ a PMHx of recently diagnosed pancreatic cancer and COPD who was sent to the ED from a NH with hypotension, hyponatremia, and SOB, found to have worsening hypoxia in the setting of abdominal ascites, hyponatremia, and ANNMARIE vs CKD, and was admitted to Medicine for further management. Since admission, patient had an abdominal U/S which showed liver cirrhosis w/ portal hypertension, moderate volume ascites, pneumobilia, and mild to moderate right hydroureteronephrosis with debris in the collecting system and trace left hydronephrosis. Given patient's recently diagnosed pancreatic cancer, Oncology was consulted for further evaluation.    Patient seen this AM. He was sitting in a chair at time of visit. He reports worsening abdominal distention over the past few weeks with associated shortness of breath. He has also had nausea and episodes of vomiting. No chest pain. Patient was afebrile overnight.     Patient was unsure of his recent cancer history, but recommended to speak with his daughter (Leighann) who had a better understanding. Spoke with patient's daughter Leighann via telephone. She reports that patient was initially admitted to [facility] from 6/10/20-20 for jaundice. He was found to have a mass that was blocking his bile duct. He subsequently had a stent placed and a biopsy was taken; however, she is unsure what the results specifically showed. After his discharge, patient started to develop progressive weakness and was sleeping more. He also began to develop worsening abdominal distention. Patient was then readmitted towards the end of 2020. He reportedly underwent a paracentesis ~ 3 weeks ago. Patient was ultimately discharged to a rehab/NH with plans to follow-up with Oncology as an outpatient. Per patient's daughter, patient was never able to visit Oncology and he has never received any treatment for cancer.    PAST MEDICAL & SURGICAL HISTORY:  Ascites  Pancreatic cancer  Simple chronic bronchitis: with home o2 PRN  Essential hypertension  Drug abuse  S/P hip replacement, right    Review of Systems:   CONSTITUTIONAL: + Generalized weakness, No fever   EYES: No eye pain or discharge  ENMT: No sinus or throat pain  NECK: No pain or stiffness  RESPIRATORY: + Shortness of breath, No wheezing  CARDIOVASCULAR: No chest pain or palpitations  GASTROINTESTINAL: + Abdominal distention, + Nausea, + Vomiting  GENITOURINARY: No dysuria or hematuria  NEUROLOGICAL: No headache or syncope  SKIN: No itching or rash  MUSCULOSKELETAL: No joint pain or joint swelling    Allergies    No Known Allergies    Intolerances    Social History: Former smoker (Smoked > 1 ppd for ~ 20 years, Quit ~ 20 years ago), No active EtOH or illicit drug use    FAMILY HISTORY:  Family history of early CAD      MEDICATIONS  (STANDING):  amoxicillin  875 milliGRAM(s)/clavulanate 1 Tablet(s) Oral every 12 hours  apixaban 2.5 milliGRAM(s) Oral every 12 hours  ascorbic acid 500 milliGRAM(s) Oral daily  budesonide  80 MICROgram(s)/formoterol 4.5 MICROgram(s) Inhaler 2 Puff(s) Inhalation two times a day  buprenorphine 8 mG/naloxone 2 mG SL  Tablet 1 Tablet(s) SubLingual <User Schedule>  metoclopramide 5 milliGRAM(s) Oral three times a day  pantoprazole    Tablet 40 milliGRAM(s) Oral before breakfast  silver sulfADIAZINE 1% Cream 1 Application(s) Topical two times a day    MEDICATIONS  (PRN):  acetaminophen   Tablet .. 1000 milliGRAM(s) Oral every 6 hours PRN Temp greater or equal to 38C (100.4F), Moderate Pain (4 - 6)  albuterol/ipratropium for Nebulization 3 milliLiter(s) Nebulizer every 6 hours PRN Shortness of Breath and/or Wheezing  magnesium hydroxide Suspension 30 milliLiter(s) Oral daily PRN Constipation  midodrine 10 milliGRAM(s) Oral every 8 hours PRN Hypotension  ondansetron Injectable 4 milliGRAM(s) IV Push every 8 hours PRN Nausea and/or Vomiting  simethicone 80 milliGRAM(s) Chew every 8 hours PRN Gas        CAPILLARY BLOOD GLUCOSE      POCT Blood Glucose.: 217 mg/dL (20 Aug 2020 12:30)  POCT Blood Glucose.: 213 mg/dL (19 Aug 2020 14:28)    I&O's Summary    19 Aug 2020 07:  -  20 Aug 2020 07:00  --------------------------------------------------------  IN: 0 mL / OUT: 75 mL / NET: -75 mL    20 Aug 2020 07:  -  20 Aug 2020 13:15  --------------------------------------------------------  IN: 0 mL / OUT: 150 mL / NET: -150 mL    Vital Signs Last 24 Hrs  T(C): 36.6 (20 Aug 2020 12:37), Max: 37 (19 Aug 2020 14:42)  T(F): 97.8 (20 Aug 2020 12:37), Max: 98.6 (19 Aug 2020 14:42)  HR: 103 (20 Aug 2020 12:37) (10 - 104)  BP: 93/55 (20 Aug 2020 12:37) (93/55 - 115/70)  BP(mean): --  RR: 20 (20 Aug 2020 12:37) (18 - 24)  SpO2: 100% (20 Aug 2020 12:37) (94% - 100%)    PHYSICAL EXAM:  GENERAL: Cachectic, Appears a bit uncomfortable, Sitting a chair  HEENT: NC/AT, Slightly dry mucous membranes  NECK: Supple  CHEST/LUNG: Slightly coarse B/L  HEART: Mild tachycardia, Regular rhythm  ABDOMEN: +BS, + Abdominal distention, Mild, diffuse TTP  EXTREMITIES: + B/L LE edema  NEUROLOGY: Awake and alert, Answering questions and following commands  SKIN: Warm and dry  PSYCH: Calm    LABS:                        10.2   13.41 )-----------( 223      ( 20 Aug 2020 09:27 )             31.4     08-20    125<L>  |  89<L>  |  89<H>  ----------------------------<  208<H>  5.7<H>   |  17<L>  |  1.88<H>    Ca    8.7      20 Aug 2020 09:27  Phos  5.1     08-20  Mg     2.7     08-20    TPro  5.8<L>  /  Alb  3.1<L>  /  TBili  1.0  /  DBili  x   /  AST  23  /  ALT  23  /  AlkPhos  167<H>  08-20    PT/INR - ( 20 Aug 2020 12:27 )   PT: 19.0 sec;   INR: 1.63 ratio         PTT - ( 20 Aug 2020 12:27 )  PTT:31.4 sec      Urinalysis Basic - ( 19 Aug 2020 17:12 )    Color: Yellow / Appearance: Slightly Turbid / S.019 / pH: x  Gluc: x / Ketone: Negative  / Bili: Negative / Urobili: 3 mg/dL   Blood: x / Protein: 30 mg/dL / Nitrite: Negative   Leuk Esterase: Small / RBC: >50 /hpf / WBC 2 /HPF   Sq Epi: x / Non Sq Epi: 1 / Bacteria: Negative    RADIOLOGY & ADDITIONAL TESTS:  Studies reviewed. HPI:  Patient is a 65 y/o M w/ a PMHx of recently diagnosed pancreatic cancer and COPD who was sent to the ED from a NH with hypotension, hyponatremia, and SOB, found to have worsening hypoxia in the setting of abdominal ascites, hyponatremia, and ANNMARIE vs CKD, and was admitted to Medicine for further management. Since admission, patient had an abdominal U/S which showed liver cirrhosis w/ portal hypertension, moderate volume ascites, pneumobilia, and mild to moderate right hydroureteronephrosis with debris in the collecting system and trace left hydronephrosis. Given patient's recently diagnosed pancreatic cancer, Oncology was consulted for further evaluation.    Patient seen this AM. He was sitting in a chair at time of visit. He reports worsening abdominal distention over the past few weeks with associated shortness of breath. He has also had nausea and episodes of vomiting. He states that he needs assistance in order to ambulate and his PO intake has been poor. Patient was afebrile overnight.     Patient was unsure of his recent cancer history, but recommended to speak with his daughter (Leighann) who had a better understanding. Spoke with patient's daughter Leighann via telephone. She reports that patient was initially admitted to [facility] from 6/10/20-20 for jaundice. He was found to have a mass that was blocking his bile duct. He subsequently had a stent placed and a biopsy was taken; however, she is unsure what the results specifically showed. After his discharge, patient started to develop progressive weakness and was sleeping more. He also began to develop worsening abdominal distention. Patient was then readmitted towards the end of 2020. He reportedly underwent a paracentesis ~ 3 weeks ago. Patient was ultimately discharged to a rehab/NH with plans to follow-up with Oncology as an outpatient. Per patient's daughter, patient was never able to visit Oncology and he has never received any treatment for cancer.    PAST MEDICAL & SURGICAL HISTORY:  Ascites  Pancreatic cancer  Simple chronic bronchitis: with home o2 PRN  Essential hypertension  Drug abuse  S/P hip replacement, right    Review of Systems:   CONSTITUTIONAL: + Generalized weakness, No fever   EYES: No eye pain or discharge  ENMT: No sinus or throat pain  NECK: No pain or stiffness  RESPIRATORY: + Shortness of breath, No wheezing  CARDIOVASCULAR: No chest pain or palpitations  GASTROINTESTINAL: + Abdominal distention, + Nausea, + Vomiting  GENITOURINARY: No dysuria or hematuria  NEUROLOGICAL: No headache or syncope  SKIN: No itching or rash  MUSCULOSKELETAL: No joint pain or joint swelling    Allergies    No Known Allergies    Intolerances    Social History: Former smoker (Smoked > 1 ppd for ~ 20 years, Quit ~ 20 years ago), No active EtOH or illicit drug use    FAMILY HISTORY:  Family history of early CAD      MEDICATIONS  (STANDING):  amoxicillin  875 milliGRAM(s)/clavulanate 1 Tablet(s) Oral every 12 hours  apixaban 2.5 milliGRAM(s) Oral every 12 hours  ascorbic acid 500 milliGRAM(s) Oral daily  budesonide  80 MICROgram(s)/formoterol 4.5 MICROgram(s) Inhaler 2 Puff(s) Inhalation two times a day  buprenorphine 8 mG/naloxone 2 mG SL  Tablet 1 Tablet(s) SubLingual <User Schedule>  metoclopramide 5 milliGRAM(s) Oral three times a day  pantoprazole    Tablet 40 milliGRAM(s) Oral before breakfast  silver sulfADIAZINE 1% Cream 1 Application(s) Topical two times a day    MEDICATIONS  (PRN):  acetaminophen   Tablet .. 1000 milliGRAM(s) Oral every 6 hours PRN Temp greater or equal to 38C (100.4F), Moderate Pain (4 - 6)  albuterol/ipratropium for Nebulization 3 milliLiter(s) Nebulizer every 6 hours PRN Shortness of Breath and/or Wheezing  magnesium hydroxide Suspension 30 milliLiter(s) Oral daily PRN Constipation  midodrine 10 milliGRAM(s) Oral every 8 hours PRN Hypotension  ondansetron Injectable 4 milliGRAM(s) IV Push every 8 hours PRN Nausea and/or Vomiting  simethicone 80 milliGRAM(s) Chew every 8 hours PRN Gas        CAPILLARY BLOOD GLUCOSE      POCT Blood Glucose.: 217 mg/dL (20 Aug 2020 12:30)  POCT Blood Glucose.: 213 mg/dL (19 Aug 2020 14:28)    I&O's Summary    19 Aug 2020 07:01  -  20 Aug 2020 07:00  --------------------------------------------------------  IN: 0 mL / OUT: 75 mL / NET: -75 mL    20 Aug 2020 07:01  -  20 Aug 2020 13:15  --------------------------------------------------------  IN: 0 mL / OUT: 150 mL / NET: -150 mL    Vital Signs Last 24 Hrs  T(C): 36.6 (20 Aug 2020 12:37), Max: 37 (19 Aug 2020 14:42)  T(F): 97.8 (20 Aug 2020 12:37), Max: 98.6 (19 Aug 2020 14:42)  HR: 103 (20 Aug 2020 12:37) (10 - 104)  BP: 93/55 (20 Aug 2020 12:37) (93/55 - 115/70)  BP(mean): --  RR: 20 (20 Aug 2020 12:37) (18 - 24)  SpO2: 100% (20 Aug 2020 12:37) (94% - 100%)    PHYSICAL EXAM:  GENERAL: Cachectic, Appears a bit uncomfortable, Sitting a chair  HEENT: NC/AT, Slightly dry mucous membranes  NECK: Supple  CHEST/LUNG: Slightly coarse B/L  HEART: Mild tachycardia, Regular rhythm  ABDOMEN: +BS, + Abdominal distention, Mild, diffuse TTP  EXTREMITIES: + B/L LE edema  NEUROLOGY: Awake and alert, Answering questions and following commands  SKIN: Warm and dry  PSYCH: Calm    LABS:                        10.2   13.41 )-----------( 223      ( 20 Aug 2020 09:27 )             31.4     08-20    125<L>  |  89<L>  |  89<H>  ----------------------------<  208<H>  5.7<H>   |  17<L>  |  1.88<H>    Ca    8.7      20 Aug 2020 09:27  Phos  5.1     08-20  Mg     2.7     08-20    TPro  5.8<L>  /  Alb  3.1<L>  /  TBili  1.0  /  DBili  x   /  AST  23  /  ALT  23  /  AlkPhos  167<H>  08-20    PT/INR - ( 20 Aug 2020 12:27 )   PT: 19.0 sec;   INR: 1.63 ratio         PTT - ( 20 Aug 2020 12:27 )  PTT:31.4 sec      Urinalysis Basic - ( 19 Aug 2020 17:12 )    Color: Yellow / Appearance: Slightly Turbid / S.019 / pH: x  Gluc: x / Ketone: Negative  / Bili: Negative / Urobili: 3 mg/dL   Blood: x / Protein: 30 mg/dL / Nitrite: Negative   Leuk Esterase: Small / RBC: >50 /hpf / WBC 2 /HPF   Sq Epi: x / Non Sq Epi: 1 / Bacteria: Negative    RADIOLOGY & ADDITIONAL TESTS:  Studies reviewed. HPI:  Patient is a 63 y/o M w/ a PMHx of recently diagnosed pancreatic cancer and COPD who was sent to the ED from a NH with hypotension, hyponatremia, and SOB, found to have worsening hypoxia in the setting of abdominal ascites, hyponatremia, and ANNMARIE vs CKD, and was admitted to Medicine for further management. Since admission, patient had an abdominal U/S which showed liver cirrhosis w/ portal hypertension, moderate volume ascites, pneumobilia, and mild to moderate right hydroureteronephrosis with debris in the collecting system and trace left hydronephrosis. Given patient's recently diagnosed pancreatic cancer, Oncology was consulted for further evaluation.    Patient seen this AM. He was sitting in a chair at time of visit. He reports worsening abdominal distention over the past few weeks with associated shortness of breath. He has also had nausea and episodes of vomiting. He states that he needs assistance in order to ambulate and his PO intake has been poor. Patient was afebrile overnight.     Patient was unsure of his recent cancer history, but recommended to speak with his daughter (Leighann) who had a better understanding. Spoke with patient's daughter Leighann via telephone. She reports that patient was initially admitted to [facility] from 6/10/20-20 for jaundice. He was found to have a mass that was blocking his bile duct. He subsequently had a stent placed and a biopsy was taken; however, she is unsure what the results specifically showed. After his discharge, patient started to develop progressive weakness and was sleeping more. He also began to develop worsening abdominal distention. Patient was then readmitted towards the end of 2020. He reportedly underwent a paracentesis ~ 3 weeks ago. Patient was ultimately discharged to a rehab/NH with plans to follow-up with Oncology as an outpatient. Per patient's daughter, patient was never able to visit Oncology and he has never received any treatment for cancer.    PAST MEDICAL & SURGICAL HISTORY:  Ascites  Pancreatic cancer  Simple chronic bronchitis: with home o2 PRN  Essential hypertension  Drug abuse  S/P hip replacement, right    Review of Systems:   CONSTITUTIONAL: + Generalized weakness, No fever   EYES: No eye pain or discharge  ENMT: No sinus or throat pain  NECK: No pain or stiffness  RESPIRATORY: + Shortness of breath, No wheezing  CARDIOVASCULAR: No chest pain or palpitations  GASTROINTESTINAL: + Abdominal distention, + Nausea, + Vomiting  GENITOURINARY: No dysuria or hematuria  NEUROLOGICAL: No headache or syncope  SKIN: No itching or rash  MUSCULOSKELETAL: No joint pain or joint swelling    Allergies    No Known Allergies    Intolerances    Social History: Former smoker (Smoked > 1 ppd for ~ 20 years, Quit ~ 20 years ago), No active EtOH or illicit drug use    FAMILY HISTORY:  Family history of early CAD      MEDICATIONS  (STANDING):  amoxicillin  875 milliGRAM(s)/clavulanate 1 Tablet(s) Oral every 12 hours  apixaban 2.5 milliGRAM(s) Oral every 12 hours  ascorbic acid 500 milliGRAM(s) Oral daily  budesonide  80 MICROgram(s)/formoterol 4.5 MICROgram(s) Inhaler 2 Puff(s) Inhalation two times a day  buprenorphine 8 mG/naloxone 2 mG SL  Tablet 1 Tablet(s) SubLingual <User Schedule>  metoclopramide 5 milliGRAM(s) Oral three times a day  pantoprazole    Tablet 40 milliGRAM(s) Oral before breakfast  silver sulfADIAZINE 1% Cream 1 Application(s) Topical two times a day    MEDICATIONS  (PRN):  acetaminophen   Tablet .. 1000 milliGRAM(s) Oral every 6 hours PRN Temp greater or equal to 38C (100.4F), Moderate Pain (4 - 6)  albuterol/ipratropium for Nebulization 3 milliLiter(s) Nebulizer every 6 hours PRN Shortness of Breath and/or Wheezing  magnesium hydroxide Suspension 30 milliLiter(s) Oral daily PRN Constipation  midodrine 10 milliGRAM(s) Oral every 8 hours PRN Hypotension  ondansetron Injectable 4 milliGRAM(s) IV Push every 8 hours PRN Nausea and/or Vomiting  simethicone 80 milliGRAM(s) Chew every 8 hours PRN Gas        CAPILLARY BLOOD GLUCOSE      POCT Blood Glucose.: 217 mg/dL (20 Aug 2020 12:30)  POCT Blood Glucose.: 213 mg/dL (19 Aug 2020 14:28)    I&O's Summary    19 Aug 2020 07:01  -  20 Aug 2020 07:00  --------------------------------------------------------  IN: 0 mL / OUT: 75 mL / NET: -75 mL    20 Aug 2020 07:01  -  20 Aug 2020 13:15  --------------------------------------------------------  IN: 0 mL / OUT: 150 mL / NET: -150 mL    Vital Signs Last 24 Hrs  T(C): 36.6 (20 Aug 2020 12:37), Max: 37 (19 Aug 2020 14:42)  T(F): 97.8 (20 Aug 2020 12:37), Max: 98.6 (19 Aug 2020 14:42)  HR: 103 (20 Aug 2020 12:) (10 - 104)  BP: 93/55 (20 Aug 2020 12:37) (93/55 - 115/70)  BP(mean): --  RR: 20 (20 Aug 2020 12:37) (18 - 24)  SpO2: 100% (20 Aug 2020 12:37) (94% - 100%)    PHYSICAL EXAM:  GENERAL: Cachectic, Appears a bit uncomfortable, Sitting a chair  HEENT: NC/AT, Slightly dry mucous membranes  NECK: Supple  CHEST/LUNG: Slightly coarse B/L  HEART: Mild tachycardia, Regular rhythm  ABDOMEN: +BS, + Abdominal distention, Mild, diffuse TTP  EXTREMITIES: + B/L LE edema  NEUROLOGY: Awake and alert, Answering questions and following commands  SKIN: Warm and dry  PSYCH: Calm  ext: full ROM x 4    LABS:                        10.2   13.41 )-----------( 223      ( 20 Aug 2020 09:27 )             31.4     08-20    125<L>  |  89<L>  |  89<H>  ----------------------------<  208<H>  5.7<H>   |  17<L>  |  1.88<H>    Ca    8.7      20 Aug 2020 09:27  Phos  5.1     08-20  Mg     2.7     08-20    TPro  5.8<L>  /  Alb  3.1<L>  /  TBili  1.0  /  DBili  x   /  AST  23  /  ALT  23  /  AlkPhos  167<H>  08-20    PT/INR - ( 20 Aug 2020 12:27 )   PT: 19.0 sec;   INR: 1.63 ratio         PTT - ( 20 Aug 2020 12:27 )  PTT:31.4 sec      Urinalysis Basic - ( 19 Aug 2020 17:12 )    Color: Yellow / Appearance: Slightly Turbid / S.019 / pH: x  Gluc: x / Ketone: Negative  / Bili: Negative / Urobili: 3 mg/dL   Blood: x / Protein: 30 mg/dL / Nitrite: Negative   Leuk Esterase: Small / RBC: >50 /hpf / WBC 2 /HPF   Sq Epi: x / Non Sq Epi: 1 / Bacteria: Negative    RADIOLOGY & ADDITIONAL TESTS:  Studies reviewed.

## 2020-08-20 NOTE — CONSULT NOTE ADULT - SUBJECTIVE AND OBJECTIVE BOX
General Surgery Consult Note  Attending:  Service: Blue Team Surgery    HPI:  64M Pmhx pancreatic cancer (dx'ed 2020, S/p EGD EUS Stent placement in 2020 and Paracentesis at [facility] 2020), COPD, presenting from nursing home with hypotension, hyponatremia and SOB. History is per nursing home chart, patient, and pt's daughter. Patient presents from nursing home after found to be hypotensive to 90's systolic. Upon arrival to ED, BP was 110 systolic, but patient was SOB with ascites and found to be hyponatremic to 125. Per pt's daughter patient was diagnosed with pancreatic cancer in 2020 at [facility], and may have started to undergo treatment, but for unclear reasons was discharged to nursing home and lost to follow up. When interviewed, patient was nauseas and vomiting bilious emesis. He reports that his nausea, abdominal distention, and SOB started 2-3 weeks ago. He also endorses generalized abdominal pain that is worse in RUQ, and lower extremity edema. He denies fever, chills, dizziness, headache, chest pain. He is unsure of further details and prognosis regarding his pancreatic cancer. He was found to be hyponatremic to 125, and was satting 99% on 3L NC. He underwent RUQ ultrasound which demonstrated pneumobilia, liver cirrhosis with portal hypertension characterized by splenomegaly and moderate volume ascites, and multiple gallbladder polyps measuring up to 4 mm. We are consulted for pneumobilia and abdominal distension.         PAST MEDICAL & SURGICAL HISTORY:  Ascites  Pancreatic cancer  Simple chronic bronchitis: with home o2 PRN  Essential hypertension  Drug abuse  S/P hip replacement, right      ALLERGIES:  Allergies    No Known Allergies    Intolerances        SOCIAL HISTORY:      FAMILY HISTORY:  Family history of early CAD      PHYSICAL EXAM:  General: NAD, resting comfortably, 3LNC, temporal wasting   HEENT: NC/AT  Pulmonary: normal resp effort, 3LNC sating 100%  Cardiovascular: RRR  Abdominal: soft, non-tender, distended, Tympanitic to percussion   Extremities: extremities cool to touch, no edema, stasis dermatitis in BLE,   Neuro: A/O x 3, normal sensation, no focal deficits, lethargic      VITAL SIGNS:  Vital Signs Last 24 Hrs  T(C): 36.6 (20 Aug 2020 12:37), Max: 36.6 (19 Aug 2020 18:18)  T(F): 97.8 (20 Aug 2020 12:37), Max: 97.9 (19 Aug 2020 18:18)  HR: 103 (20 Aug 2020 12:37) (10 - 104)  BP: 93/55 (20 Aug 2020 12:37) (93/55 - 115/70)  BP(mean): --  RR: 20 (20 Aug 2020 12:37) (18 - 21)  SpO2: 100% (20 Aug 2020 12:37) (94% - 100%)    I&O's Summary    19 Aug 2020 07:01  -  20 Aug 2020 07:00  --------------------------------------------------------  IN: 0 mL / OUT: 75 mL / NET: -75 mL    20 Aug 2020 07:01  -  20 Aug 2020 14:59  --------------------------------------------------------  IN: 0 mL / OUT: 150 mL / NET: -150 mL        LABS:                        10.2   13.41 )-----------( 223      ( 20 Aug 2020 09:27 )             31.4     08-20    125<L>  |  89<L>  |  89<H>  ----------------------------<  208<H>  5.7<H>   |  17<L>  |  1.88<H>    Ca    8.7      20 Aug 2020 09:27  Phos  5.1     08-20  Mg     2.7     08-20    TPro  5.8<L>  /  Alb  3.1<L>  /  TBili  1.0  /  DBili  x   /  AST  23  /  ALT  23  /  AlkPhos  167<H>  08-20    PT/INR - ( 20 Aug 2020 12:27 )   PT: 19.0 sec;   INR: 1.63 ratio         PTT - ( 20 Aug 2020 12:27 )  PTT:31.4 sec  Urinalysis Basic - ( 19 Aug 2020 17:12 )    Color: Yellow / Appearance: Slightly Turbid / S.019 / pH: x  Gluc: x / Ketone: Negative  / Bili: Negative / Urobili: 3 mg/dL   Blood: x / Protein: 30 mg/dL / Nitrite: Negative   Leuk Esterase: Small / RBC: >50 /hpf / WBC 2 /HPF   Sq Epi: x / Non Sq Epi: 1 / Bacteria: Negative      CAPILLARY BLOOD GLUCOSE      POCT Blood Glucose.: 217 mg/dL (20 Aug 2020 12:30)    LIVER FUNCTIONS - ( 20 Aug 2020 09:27 )  Alb: 3.1 g/dL / Pro: 5.8 g/dL / ALK PHOS: 167 U/L / ALT: 23 U/L / AST: 23 U/L / GGT: x             CULTURES:      RADIOLOGY & ADDITIONAL STUDIES:    < from: US Abdomen Complete (20 @ 09:47) >  FINDINGS:    Limited exam due to obscuring bowel gas.    Liver: Heterogeneous echogenicity with mildly nodular contour, compatible with provided history of cirrhosis. A right lobe cyst measures 1 cm.    Bile ducts: Pneumobilia. Common bile duct measures 6 mm.    Gallbladder: Multiple gallbladder polyps measuring up to 4 mm.    Pancreas: Obscured by bowel gas precluding evaluation.    Spleen: Splenomegaly measuring 13 cm.    Right kidney: 11 cm. Mild to moderate right hydroureteronephrosis with debris in the collecting system. A lower pole cyst measures 1.3 cm. Increased echogenicity.    Left kidney: Limited evaluation. 10.1 cm. Trace hydronephrosis. Increased echogenicity.    Ascites: Moderate volume ascites.    Aorta and IVC: Limited evaluation of the aorta and IVC due to obscuring bowel gas. Visualized portions are within normal limits.    IMPRESSION:    Liver cirrhosis with portal hypertension characterized by splenomegaly and moderate volume ascites.    Pneumobilia.    Pancreas is nonvisualized due to obscuring bowel gas.    Mild to moderate right hydroureteronephrosis with debris in the collecting system. Trace left hydronephrosis. Increased renal echogenicity, compatible with renal parenchymal disease.    Multiple gallbladder polyps measuring up to 4 mm. General Surgery Consult Note  Attending: Dr. Santana Blood  Service: Blue Team Surgery    HPI:  64M Pmhx pancreatic cancer (dx'ed 2020, S/p EGD EUS Stent placement in 2020 and Paracentesis at [facility] 2020), COPD, presenting from nursing home with hypotension, hyponatremia and SOB. History is per nursing home chart, patient, and pt's daughter. Patient presents from nursing home after found to be hypotensive to 90's systolic. Upon arrival to ED, BP was 110 systolic, but patient was SOB with ascites and found to be hyponatremic to 125. Per pt's daughter patient was diagnosed with pancreatic cancer in 2020 at [facility], and may have started to undergo treatment, but for unclear reasons was discharged to nursing home and lost to follow up. When interviewed, patient was nauseas and vomiting bilious emesis. He reports that his nausea, abdominal distention, and SOB started 2-3 weeks ago. He also endorses generalized abdominal pain that is worse in RUQ, and lower extremity edema. He denies fever, chills, dizziness, headache, chest pain. He is unsure of further details and prognosis regarding his pancreatic cancer. He was found to be hyponatremic to 125, and was satting 99% on 3L NC. He underwent RUQ ultrasound which demonstrated pneumobilia, liver cirrhosis with portal hypertension characterized by splenomegaly and moderate volume ascites, and multiple gallbladder polyps measuring up to 4 mm. We are consulted for pneumobilia and abdominal distension.         PAST MEDICAL & SURGICAL HISTORY:  Ascites  Pancreatic cancer  Simple chronic bronchitis: with home o2 PRN  Essential hypertension  Drug abuse  S/P hip replacement, right      ALLERGIES:  Allergies    No Known Allergies    Intolerances        SOCIAL HISTORY:      FAMILY HISTORY:  Family history of early CAD      PHYSICAL EXAM:  General: NAD, resting comfortably, 3LNC, temporal wasting   HEENT: NC/AT  Pulmonary: normal resp effort, 3LNC sating 100%  Cardiovascular: RRR  Abdominal: soft, non-tender, distended, Tympanitic to percussion   Extremities: extremities cool to touch, no edema, stasis dermatitis in BLE,   Neuro: A/O x 3, normal sensation, no focal deficits, lethargic      VITAL SIGNS:  Vital Signs Last 24 Hrs  T(C): 36.6 (20 Aug 2020 12:37), Max: 36.6 (19 Aug 2020 18:18)  T(F): 97.8 (20 Aug 2020 12:37), Max: 97.9 (19 Aug 2020 18:18)  HR: 103 (20 Aug 2020 12:37) (10 - 104)  BP: 93/55 (20 Aug 2020 12:37) (93/55 - 115/70)  BP(mean): --  RR: 20 (20 Aug 2020 12:37) (18 - 21)  SpO2: 100% (20 Aug 2020 12:37) (94% - 100%)    I&O's Summary    19 Aug 2020 07:01  -  20 Aug 2020 07:00  --------------------------------------------------------  IN: 0 mL / OUT: 75 mL / NET: -75 mL    20 Aug 2020 07:01  -  20 Aug 2020 14:59  --------------------------------------------------------  IN: 0 mL / OUT: 150 mL / NET: -150 mL        LABS:                        10.2   13.41 )-----------( 223      ( 20 Aug 2020 09:27 )             31.4     08-20    125<L>  |  89<L>  |  89<H>  ----------------------------<  208<H>  5.7<H>   |  17<L>  |  1.88<H>    Ca    8.7      20 Aug 2020 09:27  Phos  5.1     08-20  Mg     2.7     08-20    TPro  5.8<L>  /  Alb  3.1<L>  /  TBili  1.0  /  DBili  x   /  AST  23  /  ALT  23  /  AlkPhos  167<H>  08-20    PT/INR - ( 20 Aug 2020 12:27 )   PT: 19.0 sec;   INR: 1.63 ratio         PTT - ( 20 Aug 2020 12:27 )  PTT:31.4 sec  Urinalysis Basic - ( 19 Aug 2020 17:12 )    Color: Yellow / Appearance: Slightly Turbid / S.019 / pH: x  Gluc: x / Ketone: Negative  / Bili: Negative / Urobili: 3 mg/dL   Blood: x / Protein: 30 mg/dL / Nitrite: Negative   Leuk Esterase: Small / RBC: >50 /hpf / WBC 2 /HPF   Sq Epi: x / Non Sq Epi: 1 / Bacteria: Negative      CAPILLARY BLOOD GLUCOSE      POCT Blood Glucose.: 217 mg/dL (20 Aug 2020 12:30)    LIVER FUNCTIONS - ( 20 Aug 2020 09:27 )  Alb: 3.1 g/dL / Pro: 5.8 g/dL / ALK PHOS: 167 U/L / ALT: 23 U/L / AST: 23 U/L / GGT: x             CULTURES:      RADIOLOGY & ADDITIONAL STUDIES:    < from: US Abdomen Complete (20 @ 09:47) >  FINDINGS:    Limited exam due to obscuring bowel gas.    Liver: Heterogeneous echogenicity with mildly nodular contour, compatible with provided history of cirrhosis. A right lobe cyst measures 1 cm.    Bile ducts: Pneumobilia. Common bile duct measures 6 mm.    Gallbladder: Multiple gallbladder polyps measuring up to 4 mm.    Pancreas: Obscured by bowel gas precluding evaluation.    Spleen: Splenomegaly measuring 13 cm.    Right kidney: 11 cm. Mild to moderate right hydroureteronephrosis with debris in the collecting system. A lower pole cyst measures 1.3 cm. Increased echogenicity.    Left kidney: Limited evaluation. 10.1 cm. Trace hydronephrosis. Increased echogenicity.    Ascites: Moderate volume ascites.    Aorta and IVC: Limited evaluation of the aorta and IVC due to obscuring bowel gas. Visualized portions are within normal limits.    IMPRESSION:    Liver cirrhosis with portal hypertension characterized by splenomegaly and moderate volume ascites.    Pneumobilia.    Pancreas is nonvisualized due to obscuring bowel gas.    Mild to moderate right hydroureteronephrosis with debris in the collecting system. Trace left hydronephrosis. Increased renal echogenicity, compatible with renal parenchymal disease.    Multiple gallbladder polyps measuring up to 4 mm.

## 2020-08-20 NOTE — CONSULT NOTE ADULT - SUBJECTIVE AND OBJECTIVE BOX
----------------------------------------------------------  Interventional Radiology Brief Consult Note  -----------------------------------------------------------    Reason for Referral: Paracentesis     Clinical Summary: Patient is a 65 y/o M w/ a PMHx of recently diagnosed pancreatic cancer and COPD who was sent to the ED from a NH with hypotension, hyponatremia, and SOB, found to have worsening hypoxia in the setting of abdominal ascites, hyponatremia, and ANNMARIE vs CKD, and was admitted to Medicine for further management. Since admission, patient had an abdominal U/S which showed liver cirrhosis w/ portal hypertension, moderate volume ascites, pneumobilia, and mild to moderate right hydroureteronephrosis with debris in the collecting system and trace left hydronephrosis. Given patient's recently diagnosed pancreatic cancer, Oncology was consulted for further evaluation.    Vitals:  T(C): 36.6 (08-20-20 @ 12:37), Max: 36.6 (08-19-20 @ 18:18)  HR: 103 (08-20-20 @ 12:37) (10 - 104)  BP: 93/55 (08-20-20 @ 12:37) (93/55 - 115/70)  RR: 20 (08-20-20 @ 12:37) (18 - 21)  SpO2: 100% (08-20-20 @ 12:37) (94% - 100%)    Labs:           10.2  13.41)-----(223     (08-20-20 @ 09:27)         31.4     125 | 89 | 89  --------------------< 208     (08-20-20 @ 09:27)  5.7 | 17 | 1.88       PT: 19.0<H> 08-20-20 @ 12:27  aPTT: 31.4 08-20-20 @ 12:27   INR: 1.63<H> 08-20-20 @ 12:27      Assessment: 64y Male with mild to moderate ascites seen on recent abdominal ultrasound.    Recommendations:  - Diagnostic and therapeutic paracentesis 8/21/2020  - D/w Dr. Barahona

## 2020-08-20 NOTE — CHART NOTE - NSCHARTNOTEFT_GEN_A_CORE
Patient is a 65 y/o M w/ a PMHx of recently diagnosed pancreatic cancer and COPD who was sent to the ED from a NH with hypotension, hyponatremia, and SOB, found to have worsening hypoxia in the setting of abdominal ascites, hyponatremia, and ANNMARIE vs CKD, and was admitted to Medicine for further management. Patient underwent LVP today with IR, repeat labs this evening with worsening hyponatremia, metabolic acidosis, and hyperkalemia.     Recommendations:  Given this patients severe comorbid conditions, poor prognosis, no DMT per Heme/Onc, he is not a candidate for renal replacement therapy. Would treat medically at this time.    - Would place indwelling murillo, start Bicarb gtt at 100cc/hr, give lokelma 10g TID  - Repeat BMP, VBG in 4hrs, can stop bicarb gtt if pH>7.2  - Can give midodrine standing for low BP  - Check serum osmolality with next blood draw, c/w fluid restriction for now  -  Recommend Palliative consult  - Full consult note to follow in AM      Oneal Leach   Nephrology Fellow  Pager [place]: [phone]/ [facility]: [phone]  (After 5 pm or on weekends please page the on-call fellow) Patient is a 65 y/o M w/ a PMHx of recently diagnosed pancreatic cancer and COPD who was sent to the ED from a NH with hypotension, hyponatremia, and SOB, found to have worsening hypoxia in the setting of abdominal ascites, hyponatremia, and ANNMARIE vs CKD, and was admitted to Medicine for further management. Patient underwent LVP today with IR, repeat labs this evening with worsening hyponatremia, metabolic acidosis, and hyperkalemia.     Recommendations:  Given this patients severe comorbid conditions, poor prognosis, and discussion with Heme/Onc that he will not be a candidate for disease modifying therapy; he is not a candidate for renal replacement therapy. Would treat medically at this time.    - Would place indwelling murillo, start Bicarb gtt at 100cc/hr, give lokelma 10g TID  - Repeat BMP, VBG in 4hrs, can stop bicarb gtt if pH>7.2  - Can give midodrine standing for low BP  - Check serum osmolality with next blood draw, c/w fluid restriction for now  -  Recommend Palliative consult  - Full consult note to follow in AM      Oneal Leach   Nephrology Fellow  Pager [place]: [phone]/ [facility]: [phone]  (After 5 pm or on weekends please page the on-call fellow)

## 2020-08-20 NOTE — PROGRESS NOTE ADULT - PROBLEM SELECTOR PLAN 4
Multifactorial. Likely 2/2 h/o COPD + ascites   - TTE shows trace pericardial effusion + small bilateral pleural effusions, likely due to ascities   - paracentesis 8/21  - Continue supplemental nasal cannula oxygen with O2 sat goal >92  - continue home Symbicort  - duonebs PRN

## 2020-08-21 NOTE — CONSULT NOTE ADULT - PROBLEM SELECTOR RECOMMENDATION 6
MICU consulted and spoke with pts family re ACP. Pt now DNR/DNI. Pt on hi flow and receiving prn dose of dilaudid.  Adjusted meds to dilaudid ATC 1mg q4h.  Pt had been on Suboxone at home for pain because of the this pt may require higher doses of opiates due to the action of Suboxone.  PO meds d/c'd as pt has copious secretions and unable to swallow pills.  If pt stabilizes ok to move pt to PCU for continued care.  Discussed plan with Team and RN at bedside.  Palliative care will continue to follow.

## 2020-08-21 NOTE — DIETITIAN INITIAL EVALUATION ADULT. - OTHER INFO
Pt very short of breath and having difficulty talking at time of visit, limited subjective information obtained at this time. Pt reports poor appetite and PO intake. Per flowsheets, pt with 0% PO intake of breakfast this morning. Noted pt with distended abdomen. Noted pt with fecal incontinence, last BM yesterday (8/20) per flowsheets. Per Patient Profile, pt with difficulty swallowing liquids/foods. NKFA per chart. Unable to obtain nutrition history PTA from pt at this time. RD remains available.

## 2020-08-21 NOTE — CONSULT NOTE ADULT - CONSULT REQUESTED DATE/TIME
20-Aug-2020 13:15
20-Aug-2020 14:58
20-Aug-2020 15:04
20-Aug-2020 21:47
21-Aug-2020 06:42
21-Aug-2020 14:26
21-Aug-2020 18:35
21-Aug-2020 14:00

## 2020-08-21 NOTE — ADVANCED PRACTICE NURSE CONSULT - ASSESSMENT
The pt is on 5 Yuan on a [employer] P 500 support surface for low air-loss and pressure redistribution features. As per review of the nursing documentation, the pt is being assisted with turning and positioning.  The pt has a thin cachectic appearance- a nutrition consult is pending for further evaluation. His abdomen is distended, he is s/p  paracentesis 8/20.  Upon assessment, pt presents with partial-thickness wounds on the posterior aspects of the BLE. They present in a similar fashion on both legs with a layer of thin, yellow fibrinous exudate. the wounds are moist. The PT pulse was palpable on the RLE, unable to palpate the pulses on the LLE. The feet were warm, the toenails elongated.  Will recommend the application of Acticoat  dressings to decrease the bioburden and promote drying.

## 2020-08-21 NOTE — PROGRESS NOTE ADULT - PROBLEM SELECTOR PLAN 1
Per patient + daughter, has pancreatic cancer that was diagnosed in June 2020. Records from early August 2020 hospitalization show infiltrative pancreatic and biliary ductal dilitation on CT scan  - Oncology consulted   - for now, supportive care as per below  - Patient currently with poor insight into illness. Current status is full code. Will continue goals of care discussion  - nutrition consult Patient with worsening SOB with accessory muscle use and unable to clear secretions, with multifocal PNA found on CT chest  - SOB not relieved s/p paracentesis, most likely 2/2 PNA, also need to r/o increasing pleural effusion  - obtaining new CXR  - Abx broadened to Zosyn (renal dosing)   - Added Saline nebs BID for secretions  - Duonebs now Q6H standing  - continue home Symbicort

## 2020-08-21 NOTE — PROVIDER CONTACT NOTE (CHANGE IN STATUS NOTIFICATION) - ASSESSMENT
Pt's vitals taken, found to be abnormal. pt has labored breathing, tachypnic, dyspnic, and using accessory muscles. Pt unresponsive when verbal communication initiated

## 2020-08-21 NOTE — PROGRESS NOTE ADULT - PROBLEM SELECTOR PLAN 7
Patient with h/o COPD, likely contributing to pt's SOB  - continue home Symbicort  - continue nasal cannula oxygen Diet: DASH/TLC  DVT: apixaban 2.5 mg Q12H (per outside records, appears he may be taking for suspected IVC thrombus)  CODE: full code Diet: DASH/TLC  DVT: apixaban 2.5 mg Q12H (per outside records, appears he may be taking for suspected IVC thrombus)  CODE: full code, conversation ongoing

## 2020-08-21 NOTE — CONSULT NOTE ADULT - ASSESSMENT
63 y/o male pt with bilat ingrown 2nd toenails  - pt seen and evaluated   - bilat 2nd toenails slant back procedures done with no complications  - toenails debrided x 10 using sterile nippers  - no open wounds noted  - rec z flows while in bed at all times  - follow up as outpatient upon discharge (call  for appointment)

## 2020-08-21 NOTE — CONSULT NOTE ADULT - ASSESSMENT
64M Pmhx pancreatic cancer (dx'ed June 2020), COPD with hypoxic respiratory failure, and metabolic acidosis in the setting of renal failure     #Hypoxic respiratory failure: Multifocal PNA   Acute decompensation likely related to aspiration event given presence of copious secretions and inability to clear and protect airway (deconditioned)  - Comfort measures   - c/w Zosyn if aligned with GOC  - Supplemental oxygen as needed  - DNR/DNI  - can give Dilaudid 1mg Q4hr prn for pain/dyspnea, ativan 0.2mg IV prn Q4hrs for anxiety, please consult pal care for symptom management    In light of current focus on comfort rather than invasive measures, the patient is not a candidate for MICU    The patient was seen and evaluated by myself, MICU fellow, and attending Dr. Frank Tompkins MD JOAQUIM  Internal Medicine PGY-3  Pager: [facility]: [phone]; [facility] [id number]

## 2020-08-21 NOTE — DIETITIAN INITIAL EVALUATION ADULT. - PROBLEM SELECTOR PLAN 3
Multifactorial. Likely 2/2 h/o COPD + ascites   - TTE shows trace pericardial effusion + small bilateral pleural effusions, likely due to ascities   - Continue supplemental nasal cannula oxygen with O2 sat goal >92  - continue home Symbicort  - duonebs PRN  - if worsening SOB, consider IV lasix + repeat CXR + volume status assessment

## 2020-08-21 NOTE — DIETITIAN INITIAL EVALUATION ADULT. - ADD RECOMMEND
1) Recommend Ensure Enlive 3 daily (350 kcal, 20 g protein in each), thickened to appropriate consistency PRN, to optimize intake 2) Suggest multivitamin supplementation if no medical contraindications 3) Consider formal swallowing evaluation with speech pathologist in setting of swallowing difficulty 4) Encourage PO intake. Patient made aware RD remains available. 5) Monitor PO intake, weight, labs, skin, GI status, diet 6) Malnutrition sticker placed, RD to follow-up as per protocol

## 2020-08-21 NOTE — CONSULT NOTE ADULT - SUBJECTIVE AND OBJECTIVE BOX
[facility] DIVISION OF KIDNEY DISEASES AND HYPERTENSION -- INITIAL CONSULT NOTE  --------------------------------------------------------------------------------  Oneal Leach   Nephrology Fellow  Pager NS: [phone]/ [facility]: [phone]  (After 5 pm or on weekends please page the on-call fellow)    HPI: Patient is a 64y old M w/ a PMHx of recently diagnosed pancreatic cancer and COPD who was sent to the ED from a NH with hypotension, hyponatremia, and SOB, found to have worsening hypoxia in the setting of abdominal ascites, hyponatremia, and ANNMARIE vs CKD, and was admitted to Medicine for further management. Patient underwent LVP on 8/20 with IR,2.7L removed. CT A/P from 8/20 with moderate right hydroureteronephrosis and mild left hydronephrosis better characterized at prior same day ultrasound. Nephrology was consulted for ANNMARIE, hyperkalemia (5.9), metabolic acidosis (Bicarb<10) management. Started on bicarb gtt, given lokelma 10g TID, indwelling murillo catheter placed.  Evening labs improved to K of 4.9, Co2- 18, Na 124, Cr of 1.89mg/dl        PAST HISTORY  --------------------------------------------------------------------------------  PAST MEDICAL & SURGICAL HISTORY:  Ascites  Pancreatic cancer  Simple chronic bronchitis: with home o2 PRN  Essential hypertension  Drug abuse  S/P hip replacement, right    FAMILY HISTORY:  Family history of early CAD    PAST SOCIAL HISTORY: Lives at nursing home    ALLERGIES & MEDICATIONS  --------------------------------------------------------------------------------  Allergies    No Known Allergies    Intolerances      Standing Inpatient Medications  albuterol/ipratropium for Nebulization 3 milliLiter(s) Nebulizer every 6 hours  amoxicillin  875 milliGRAM(s)/clavulanate 1 Tablet(s) Oral every 12 hours  apixaban 2.5 milliGRAM(s) Oral every 12 hours  budesonide  80 MICROgram(s)/formoterol 4.5 MICROgram(s) Inhaler 2 Puff(s) Inhalation two times a day  buprenorphine 8 mG/naloxone 2 mG SL  Tablet 1 Tablet(s) SubLingual <User Schedule>  metoclopramide 5 milliGRAM(s) Oral three times a day  midodrine 20 milliGRAM(s) Oral every 8 hours  pantoprazole    Tablet 40 milliGRAM(s) Oral before breakfast  silver sulfADIAZINE 1% Cream 1 Application(s) Topical two times a day  sodium bicarbonate  Infusion 0.242 mEq/kG/Hr IV Continuous <Continuous>  sodium zirconium cyclosilicate 10 Gram(s) Oral every 8 hours    PRN Inpatient Medications  acetaminophen   Tablet .. 1000 milliGRAM(s) Oral every 6 hours PRN  magnesium hydroxide Suspension 30 milliLiter(s) Oral daily PRN  ondansetron Injectable 4 milliGRAM(s) IV Push every 8 hours PRN  simethicone 80 milliGRAM(s) Chew every 8 hours PRN      REVIEW OF SYSTEMS  --------------------------------------------------------------------------------  Gen: + lethargy  Respiratory: + dyspnea  CV: No chest pain  GI: No abdominal pain/ diarrhea   MSK: + LE edema  Neuro: No dizziness  Heme: No bleeding    All other systems were reviewed and are negative, except as noted.      VITALS/PHYSICAL EXAM  --------------------------------------------------------------------------------  T(C): 36.3 (08-21-20 @ 02:45), Max: 37.1 (08-20-20 @ 18:18)  HR: 102 (08-21-20 @ 06:03) (10 - 103)  BP: 94/67 (08-21-20 @ 02:45) (83/45 - 105/65)  RR: 20 (08-21-20 @ 02:45) (20 - 21)  SpO2: 96% (08-21-20 @ 06:03) (96% - 100%)  Wt(kg): --  Height (cm): 177.8 (08-20-20 @ 15:46)  Weight (kg): 62 (08-20-20 @ 15:46)  BMI (kg/m2): 19.6 (08-20-20 @ 15:46)  BSA (m2): 1.78 (08-20-20 @ 15:46)      08-19-20 @ 07:01  -  08-20-20 @ 07:00  --------------------------------------------------------  IN: 0 mL / OUT: 75 mL / NET: -75 mL    08-20-20 @ 07:01  -  08-21-20 @ 06:42  --------------------------------------------------------  IN: 0 mL / OUT: 150 mL / NET: -150 mL      Physical Exam:    	Gen: Cachectic   	HEENT: Anicteric   	Pulm: Coarse BS b/l  	CV: S1S2  	Abd: +BS, soft, nontender/distended       	: No suprapubic tenderness  	MSK: Warm, no edema  	Neuro: AOX3      	      LABS/STUDIES  --------------------------------------------------------------------------------              10.2   13.41 >-----------<  223      [08-20-20 @ 09:27]              31.4     124  |  91  |  91  ----------------------------<  213      [08-20-20 @ 23:00]  4.9   |  18  |  1.89        Ca     8.7     [08-20-20 @ 23:00]      Mg     2.8     [08-20-20 @ 16:25]      Phos  4.6     [08-20-20 @ 16:25]    TPro  5.8  /  Alb  3.1  /  TBili  1.0  /  DBili  x   /  AST  23  /  ALT  23  /  AlkPhos  167  [08-20-20 @ 09:27]    PT/INR: PT 19.0 , INR 1.63       [08-20-20 @ 12:27]  PTT: 31.4       [08-20-20 @ 12:27]    Serum Osmolality 305      [08-20-20 @ 23:00]    Creatinine Trend:  SCr 1.89 [08-20 @ 23:00]  SCr 1.99 [08-20 @ 16:25]  SCr 1.88 [08-20 @ 09:27]  SCr 1.73 [08-19 @ 22:05]  SCr 1.71 [08-19 @ 14:46]    Urinalysis - [08-20-20 @ 16:52]      Color Light Yellow / Appearance Slightly Turbid / SG 1.005 / pH 6.0      Gluc Negative / Ketone Negative  / Bili Negative / Urobili Negative       Blood Moderate / Protein Negative / Leuk Est Negative / Nitrite Negative      RBC 10 / WBC 0 / Hyaline  / Gran  / Sq Epi  / Non Sq Epi 0 / Bacteria Negative    Urine Creatinine 60      [08-20-20 @ 23:00]  Urine Sodium <35      [08-20-20 @ 23:00]  Urine Urea Nitrogen 586      [08-21-20 @ 02:00]  Urine Osmolality 361      [08-20-20 @ 23:00]      HCV 0.09, Nonreact      [08-20-20 @ 13:04] [facility] DIVISION OF KIDNEY DISEASES AND HYPERTENSION -- INITIAL CONSULT NOTE  --------------------------------------------------------------------------------  Oenal Leach   Nephrology Fellow  Pager NS: [phone]/ [facility]: [phone]  (After 5 pm or on weekends please page the on-call fellow)    HPI: Patient is a 64y old M w/ a PMHx of recently diagnosed pancreatic cancer and COPD who was sent to the ED from a NH with hypotension, hyponatremia, and SOB, found to have worsening hypoxia in the setting of abdominal ascites, hyponatremia, and ANNMARIE vs CKD, and was admitted to Medicine for further management. Patient underwent LVP on 8/20 with IR,2.7L removed. CT A/P from 8/20 with moderate right hydroureteronephrosis and mild left hydronephrosis better characterized at prior same day ultrasound. Nephrology was consulted for ANNMARIE, hyperkalemia (5.9), metabolic acidosis (Bicarb<10) management. Started on bicarb gtt, given lokelma 10g TID, indwelling murillo catheter placed.  Evening labs improved to K of 4.9, Co2- 18, Na 124, Cr of 1.89mg/dl        PAST HISTORY  --------------------------------------------------------------------------------  PAST MEDICAL & SURGICAL HISTORY:  Ascites  Pancreatic cancer  Simple chronic bronchitis: with home o2 PRN  Essential hypertension  Drug abuse  S/P hip replacement, right    FAMILY HISTORY:  Family history of early CAD    PAST SOCIAL HISTORY: Lives at nursing home    ALLERGIES & MEDICATIONS  --------------------------------------------------------------------------------  Allergies    No Known Allergies    Intolerances      Standing Inpatient Medications  albuterol/ipratropium for Nebulization 3 milliLiter(s) Nebulizer every 6 hours  amoxicillin  875 milliGRAM(s)/clavulanate 1 Tablet(s) Oral every 12 hours  apixaban 2.5 milliGRAM(s) Oral every 12 hours  budesonide  80 MICROgram(s)/formoterol 4.5 MICROgram(s) Inhaler 2 Puff(s) Inhalation two times a day  buprenorphine 8 mG/naloxone 2 mG SL  Tablet 1 Tablet(s) SubLingual <User Schedule>  metoclopramide 5 milliGRAM(s) Oral three times a day  midodrine 20 milliGRAM(s) Oral every 8 hours  pantoprazole    Tablet 40 milliGRAM(s) Oral before breakfast  silver sulfADIAZINE 1% Cream 1 Application(s) Topical two times a day  sodium bicarbonate  Infusion 0.242 mEq/kG/Hr IV Continuous <Continuous>  sodium zirconium cyclosilicate 10 Gram(s) Oral every 8 hours    PRN Inpatient Medications  acetaminophen   Tablet .. 1000 milliGRAM(s) Oral every 6 hours PRN  magnesium hydroxide Suspension 30 milliLiter(s) Oral daily PRN  ondansetron Injectable 4 milliGRAM(s) IV Push every 8 hours PRN  simethicone 80 milliGRAM(s) Chew every 8 hours PRN      REVIEW OF SYSTEMS  --------------------------------------------------------------------------------  Unable to assess      VITALS/PHYSICAL EXAM  --------------------------------------------------------------------------------  T(C): 36.3 (08-21-20 @ 02:45), Max: 37.1 (08-20-20 @ 18:18)  HR: 102 (08-21-20 @ 06:03) (10 - 103)  BP: 94/67 (08-21-20 @ 02:45) (83/45 - 105/65)  RR: 20 (08-21-20 @ 02:45) (20 - 21)  SpO2: 96% (08-21-20 @ 06:03) (96% - 100%)  Wt(kg): --  Height (cm): 177.8 (08-20-20 @ 15:46)  Weight (kg): 62 (08-20-20 @ 15:46)  BMI (kg/m2): 19.6 (08-20-20 @ 15:46)  BSA (m2): 1.78 (08-20-20 @ 15:46)      08-19-20 @ 07:01  -  08-20-20 @ 07:00  --------------------------------------------------------  IN: 0 mL / OUT: 75 mL / NET: -75 mL    08-20-20 @ 07:01  -  08-21-20 @ 06:42  --------------------------------------------------------  IN: 0 mL / OUT: 150 mL / NET: -150 mL      Physical Exam:    	Gen: Cachectic   	HEENT: Anicteric   	Pulm: Coarse BS b/l  	CV: S1S2  	Abd: +BS, soft, nontender/distended       	: + murillo with coca cola colored urine  	MSK: Warm, no edema  	Neuro: Awake, confused  	Skin:  Dry intact      	      LABS/STUDIES  --------------------------------------------------------------------------------              10.2   13.41 >-----------<  223      [08-20-20 @ 09:27]              31.4     124  |  91  |  91  ----------------------------<  213      [08-20-20 @ 23:00]  4.9   |  18  |  1.89        Ca     8.7     [08-20-20 @ 23:00]      Mg     2.8     [08-20-20 @ 16:25]      Phos  4.6     [08-20-20 @ 16:25]    TPro  5.8  /  Alb  3.1  /  TBili  1.0  /  DBili  x   /  AST  23  /  ALT  23  /  AlkPhos  167  [08-20-20 @ 09:27]    PT/INR: PT 19.0 , INR 1.63       [08-20-20 @ 12:27]  PTT: 31.4       [08-20-20 @ 12:27]    Serum Osmolality 305      [08-20-20 @ 23:00]    Creatinine Trend:  SCr 1.89 [08-20 @ 23:00]  SCr 1.99 [08-20 @ 16:25]  SCr 1.88 [08-20 @ 09:27]  SCr 1.73 [08-19 @ 22:05]  SCr 1.71 [08-19 @ 14:46]    Urinalysis - [08-20-20 @ 16:52]      Color Light Yellow / Appearance Slightly Turbid / SG 1.005 / pH 6.0      Gluc Negative / Ketone Negative  / Bili Negative / Urobili Negative       Blood Moderate / Protein Negative / Leuk Est Negative / Nitrite Negative      RBC 10 / WBC 0 / Hyaline  / Gran  / Sq Epi  / Non Sq Epi 0 / Bacteria Negative    Urine Creatinine 60      [08-20-20 @ 23:00]  Urine Sodium <35      [08-20-20 @ 23:00]  Urine Urea Nitrogen 586      [08-21-20 @ 02:00]  Urine Osmolality 361      [08-20-20 @ 23:00]      HCV 0.09, Nonreact      [08-20-20 @ 13:04]

## 2020-08-21 NOTE — DIETITIAN INITIAL EVALUATION ADULT. - PHYSICAL APPEARANCE
underweight/other (specify)/Nutrition-focused physical exam deferred at this time, pt appearing uncomfortable and with difficulty breathing. Observed with severe fat wasting to orbital and buccal region and severe muscle wasting to temple and clavicle regions. Ht: 70 inches (177.8 cm) Wt: 125.8 pounds (57.2 kg) (8/20 standing)  BMI: 18.1 kg/m2 (based on 8/20 standing wt)  IBW: 166 pounds +/-10% %IBW: 76% (based on 8/20 standing wt)  Skin: +wounds, no pressure injuries per flowsheets   Edema: 2+ edema to b/l feet, b/l legs, b/l ankles as per flowsheets

## 2020-08-21 NOTE — PROGRESS NOTE ADULT - SUBJECTIVE AND OBJECTIVE BOX
SUBJECTIVE:   Patient seen and examined during AM rounds. Sitting up uncomfortable while undergoing duonebs treatment.      OBJECTIVE: T(C): 36.3 (20 @ 06:54), Max: 37.1 (20 @ 18:18)  HR: 105 (20 @ 06:54) (10 - 105)  BP: 103/66 (20 @ 06:54) (83/45 - 105/65)  RR: 22 (20 @ 06:54) (20 - )  SpO2: 95% (20 @ 06:54) (95% - 100%)  Wt(kg): --  I&O's Summary    20 Aug 2020 07:  -  21 Aug 2020 07:00  --------------------------------------------------------  IN: 0 mL / OUT: 150 mL / NET: -150 mL      I&O's Detail    20 Aug 2020 07:  -  21 Aug 2020 07:00  --------------------------------------------------------  IN:  Total IN: 0 mL    OUT:    Intermittent Catheterization - Urethral: 150 mL  Total OUT: 150 mL    Total NET: -150 mL        General: uncomfortable, undergoing duonebs tx  Respiratory: tachypneic, labored breathing, on duonebs, on cont pulse ox  Abdomen: soft, nontender, nondistended  Vascular: warm and well perfused     MEDICATIONS  (STANDING):  albuterol/ipratropium for Nebulization 3 milliLiter(s) Nebulizer every 6 hours  amoxicillin  875 milliGRAM(s)/clavulanate 1 Tablet(s) Oral every 12 hours  apixaban 2.5 milliGRAM(s) Oral every 12 hours  budesonide  80 MICROgram(s)/formoterol 4.5 MICROgram(s) Inhaler 2 Puff(s) Inhalation two times a day  buprenorphine 8 mG/naloxone 2 mG SL  Tablet 1 Tablet(s) SubLingual <User Schedule>  metoclopramide 5 milliGRAM(s) Oral three times a day  midodrine 20 milliGRAM(s) Oral every 8 hours  pantoprazole    Tablet 40 milliGRAM(s) Oral before breakfast  silver sulfADIAZINE 1% Cream 1 Application(s) Topical two times a day  sodium bicarbonate  Infusion 0.242 mEq/kG/Hr (100 mL/Hr) IV Continuous <Continuous>  sodium zirconium cyclosilicate 10 Gram(s) Oral every 8 hours    MEDICATIONS  (PRN):  acetaminophen   Tablet .. 1000 milliGRAM(s) Oral every 6 hours PRN Temp greater or equal to 38C (100.4F), Moderate Pain (4 - 6)  magnesium hydroxide Suspension 30 milliLiter(s) Oral daily PRN Constipation  ondansetron Injectable 4 milliGRAM(s) IV Push every 8 hours PRN Nausea and/or Vomiting  simethicone 80 milliGRAM(s) Chew every 8 hours PRN Gas      LABS:                        10.2   13.41 )-----------( 223      ( 20 Aug 2020 09:27 )             31.4     08-20    124<L>  |  91<L>  |  91<H>  ----------------------------<  213<H>  4.9   |  18<L>  |  1.89<H>    Ca    8.7      20 Aug 2020 23:00  Phos  4.6     08-20  Mg     2.8     08-20    TPro  5.8<L>  /  Alb  3.1<L>  /  TBili  1.0  /  DBili  x   /  AST  23  /  ALT  23  /  AlkPhos  167<H>  08-20    PT/INR - ( 20 Aug 2020 12:27 )   PT: 19.0 sec;   INR: 1.63 ratio         PTT - ( 20 Aug 2020 12:27 )  PTT:31.4 sec  Urinalysis Basic - ( 20 Aug 2020 16:52 )    Color: Light Yellow / Appearance: Slightly Turbid / S.005 / pH: x  Gluc: x / Ketone: Negative  / Bili: Negative / Urobili: Negative   Blood: x / Protein: Negative / Nitrite: Negative   Leuk Esterase: Negative / RBC: 10 /hpf / WBC 0 /HPF   Sq Epi: x / Non Sq Epi: 0 /hpf / Bacteria: Negative        RADIOLOGY & ADDITIONAL STUDIES:  < from: CT Chest No Cont (20 @ 14:46) >  IMPRESSION:    Secretions within the tracheobronchial tree as described above. Findings most consistent with multifocal pneumonia. Trace left pleural fluid.    Prominent colon. No small bowel dilatation. Wall thickening distal sigmoid colon. Consider bedside rectosigmoidoscopy. Differential diagnosis is given above.    Large volume ascites.    Moderate right hydroureteronephrosis and mild left hydronephrosis better characterized at prior same day ultrasound.    Patent biliary stent.    < end of copied text >  < from: CT Abdomen and Pelvis No Cont (20 @ 14:46) >  IMPRESSION:    Secretions within the tracheobronchial tree as described above. Findings most consistent with multifocal pneumonia. Trace left pleural fluid.    Prominent colon. No small bowel dilatation. Wall thickening distal sigmoid colon. Consider bedside rectosigmoidoscopy. Differential diagnosis is given above.    Large volume ascites.    Moderate right hydroureteronephrosis and mild left hydronephrosis better characterized at prior same day ultrasound.    Patent biliary stent.    < end of copied text >    Assessment:  64M Pmhx pancreatic cancer (dx'ed 2020, S/p EGD EUS Stent placement in 2020 and Paracentesis at [facility] 2020), COPD, presenting from nursing home with hypotension, hyponatremia and SOB n/w pneumobilia and abdominal distension. This is likely related to previous ERCP and stent placement.     Plan:  - CT final read pneumobilia with patent ERCP stent, no acute intraabdominal pathology  - No acute surgical intervention at this time  - Recommend Triple phase Pancreas Protocol CT or MRCP when able and ANNMARIE resolved for detailed imaging of pancreatic mass  - Will continue to follow     [facility]  [id number] SUBJECTIVE:   Patient seen and examined during AM rounds. Sitting up uncomfortable while undergoing duonebs treatment.      OBJECTIVE: T(C): 36.3 (20 @ 06:54), Max: 37.1 (20 @ 18:18)  HR: 105 (20 @ 06:54) (10 - 105)  BP: 103/66 (20 @ 06:54) (83/45 - 105/65)  RR: 22 (20 @ 06:54) (20 - 22)  SpO2: 95% (20 @ 06:54) (95% - 100%)  Wt(kg): --  I&O's Summary    20 Aug 2020 07:  -  21 Aug 2020 07:00  --------------------------------------------------------  IN: 0 mL / OUT: 150 mL / NET: -150 mL      I&O's Detail    20 Aug 2020 07:  -  21 Aug 2020 07:00  --------------------------------------------------------  IN:  Total IN: 0 mL    OUT:    Intermittent Catheterization - Urethral: 150 mL  Total OUT: 150 mL    Total NET: -150 mL        General: uncomfortable, undergoing duonebs tx  Respiratory: tachypneic, labored breathing, on duonebs, on cont pulse ox. on 2L NC.  Abdomen: soft, nontender, nondistended  Vascular: warm and well perfused     MEDICATIONS  (STANDING):  albuterol/ipratropium for Nebulization 3 milliLiter(s) Nebulizer every 6 hours  amoxicillin  875 milliGRAM(s)/clavulanate 1 Tablet(s) Oral every 12 hours  apixaban 2.5 milliGRAM(s) Oral every 12 hours  budesonide  80 MICROgram(s)/formoterol 4.5 MICROgram(s) Inhaler 2 Puff(s) Inhalation two times a day  buprenorphine 8 mG/naloxone 2 mG SL  Tablet 1 Tablet(s) SubLingual <User Schedule>  metoclopramide 5 milliGRAM(s) Oral three times a day  midodrine 20 milliGRAM(s) Oral every 8 hours  pantoprazole    Tablet 40 milliGRAM(s) Oral before breakfast  silver sulfADIAZINE 1% Cream 1 Application(s) Topical two times a day  sodium bicarbonate  Infusion 0.242 mEq/kG/Hr (100 mL/Hr) IV Continuous <Continuous>  sodium zirconium cyclosilicate 10 Gram(s) Oral every 8 hours    MEDICATIONS  (PRN):  acetaminophen   Tablet .. 1000 milliGRAM(s) Oral every 6 hours PRN Temp greater or equal to 38C (100.4F), Moderate Pain (4 - 6)  magnesium hydroxide Suspension 30 milliLiter(s) Oral daily PRN Constipation  ondansetron Injectable 4 milliGRAM(s) IV Push every 8 hours PRN Nausea and/or Vomiting  simethicone 80 milliGRAM(s) Chew every 8 hours PRN Gas      LABS:                        10.2   13.41 )-----------( 223      ( 20 Aug 2020 09:27 )             31.4     08-20    124<L>  |  91<L>  |  91<H>  ----------------------------<  213<H>  4.9   |  18<L>  |  1.89<H>    Ca    8.7      20 Aug 2020 23:00  Phos  4.6     08-20  Mg     2.8     08-20    TPro  5.8<L>  /  Alb  3.1<L>  /  TBili  1.0  /  DBili  x   /  AST  23  /  ALT  23  /  AlkPhos  167<H>  08-20    PT/INR - ( 20 Aug 2020 12:27 )   PT: 19.0 sec;   INR: 1.63 ratio         PTT - ( 20 Aug 2020 12:27 )  PTT:31.4 sec  Urinalysis Basic - ( 20 Aug 2020 16:52 )    Color: Light Yellow / Appearance: Slightly Turbid / S.005 / pH: x  Gluc: x / Ketone: Negative  / Bili: Negative / Urobili: Negative   Blood: x / Protein: Negative / Nitrite: Negative   Leuk Esterase: Negative / RBC: 10 /hpf / WBC 0 /HPF   Sq Epi: x / Non Sq Epi: 0 /hpf / Bacteria: Negative        RADIOLOGY & ADDITIONAL STUDIES:  < from: CT Chest No Cont (20 @ 14:46) >  IMPRESSION:    Secretions within the tracheobronchial tree as described above. Findings most consistent with multifocal pneumonia. Trace left pleural fluid.    Prominent colon. No small bowel dilatation. Wall thickening distal sigmoid colon. Consider bedside rectosigmoidoscopy. Differential diagnosis is given above.    Large volume ascites.    Moderate right hydroureteronephrosis and mild left hydronephrosis better characterized at prior same day ultrasound.    Patent biliary stent.    < end of copied text >  < from: CT Abdomen and Pelvis No Cont (20 @ 14:46) >  IMPRESSION:    Secretions within the tracheobronchial tree as described above. Findings most consistent with multifocal pneumonia. Trace left pleural fluid.    Prominent colon. No small bowel dilatation. Wall thickening distal sigmoid colon. Consider bedside rectosigmoidoscopy. Differential diagnosis is given above.    Large volume ascites.    Moderate right hydroureteronephrosis and mild left hydronephrosis better characterized at prior same day ultrasound.    Patent biliary stent.    < end of copied text >    Assessment:  64M Pmhx pancreatic cancer (dx'ed 2020, S/p EGD EUS Stent placement in 2020 and Paracentesis at [facility] 2020), COPD, presenting from nursing home with hypotension, hyponatremia and SOB n/w pneumobilia and abdominal distension. This is likely related to previous ERCP and stent placement.     Plan:  - Recommend obtaining outside hospital records from [facility]  - No acute surgical intervention at this time  - Recommend Triple phase Pancreas Protocol CT or MRCP when able and ANNMARIE resolved for detailed imaging of pancreatic mass  - PNA management as per primary team     [facility]  9000 SUBJECTIVE:   Patient seen and examined during AM rounds. Sitting up uncomfortable while undergoing duonebs treatment.      OBJECTIVE: T(C): 36.3 (20 @ 06:54), Max: 37.1 (20 @ 18:18)  HR: 105 (20 @ 06:54) (10 - 105)  BP: 103/66 (20 @ 06:54) (83/45 - 105/65)  RR: 22 (20 @ 06:54) (20 - 22)  SpO2: 95% (20 @ 06:54) (95% - 100%)  Wt(kg): --  I&O's Summary    20 Aug 2020 07:  -  21 Aug 2020 07:00  --------------------------------------------------------  IN: 0 mL / OUT: 150 mL / NET: -150 mL      I&O's Detail    20 Aug 2020 07:  -  21 Aug 2020 07:00  --------------------------------------------------------  IN:  Total IN: 0 mL    OUT:    Intermittent Catheterization - Urethral: 150 mL  Total OUT: 150 mL    Total NET: -150 mL        General: uncomfortable, undergoing duonebs tx  Respiratory: tachypneic, labored breathing, on duonebs, on cont pulse ox. on 2L NC.  Abdomen: soft, nontender, nondistended  Vascular: warm and well perfused     MEDICATIONS  (STANDING):  albuterol/ipratropium for Nebulization 3 milliLiter(s) Nebulizer every 6 hours  amoxicillin  875 milliGRAM(s)/clavulanate 1 Tablet(s) Oral every 12 hours  apixaban 2.5 milliGRAM(s) Oral every 12 hours  budesonide  80 MICROgram(s)/formoterol 4.5 MICROgram(s) Inhaler 2 Puff(s) Inhalation two times a day  buprenorphine 8 mG/naloxone 2 mG SL  Tablet 1 Tablet(s) SubLingual <User Schedule>  metoclopramide 5 milliGRAM(s) Oral three times a day  midodrine 20 milliGRAM(s) Oral every 8 hours  pantoprazole    Tablet 40 milliGRAM(s) Oral before breakfast  silver sulfADIAZINE 1% Cream 1 Application(s) Topical two times a day  sodium bicarbonate  Infusion 0.242 mEq/kG/Hr (100 mL/Hr) IV Continuous <Continuous>  sodium zirconium cyclosilicate 10 Gram(s) Oral every 8 hours    MEDICATIONS  (PRN):  acetaminophen   Tablet .. 1000 milliGRAM(s) Oral every 6 hours PRN Temp greater or equal to 38C (100.4F), Moderate Pain (4 - 6)  magnesium hydroxide Suspension 30 milliLiter(s) Oral daily PRN Constipation  ondansetron Injectable 4 milliGRAM(s) IV Push every 8 hours PRN Nausea and/or Vomiting  simethicone 80 milliGRAM(s) Chew every 8 hours PRN Gas      LABS:                        10.2   13.41 )-----------( 223      ( 20 Aug 2020 09:27 )             31.4     08-20    124<L>  |  91<L>  |  91<H>  ----------------------------<  213<H>  4.9   |  18<L>  |  1.89<H>    Ca    8.7      20 Aug 2020 23:00  Phos  4.6     08-20  Mg     2.8     08-20    TPro  5.8<L>  /  Alb  3.1<L>  /  TBili  1.0  /  DBili  x   /  AST  23  /  ALT  23  /  AlkPhos  167<H>  08-20    PT/INR - ( 20 Aug 2020 12:27 )   PT: 19.0 sec;   INR: 1.63 ratio         PTT - ( 20 Aug 2020 12:27 )  PTT:31.4 sec  Urinalysis Basic - ( 20 Aug 2020 16:52 )    Color: Light Yellow / Appearance: Slightly Turbid / S.005 / pH: x  Gluc: x / Ketone: Negative  / Bili: Negative / Urobili: Negative   Blood: x / Protein: Negative / Nitrite: Negative   Leuk Esterase: Negative / RBC: 10 /hpf / WBC 0 /HPF   Sq Epi: x / Non Sq Epi: 0 /hpf / Bacteria: Negative        RADIOLOGY & ADDITIONAL STUDIES:  < from: CT Chest No Cont (20 @ 14:46) >  IMPRESSION:    Secretions within the tracheobronchial tree as described above. Findings most consistent with multifocal pneumonia. Trace left pleural fluid.    Prominent colon. No small bowel dilatation. Wall thickening distal sigmoid colon. Consider bedside rectosigmoidoscopy. Differential diagnosis is given above.    Large volume ascites.    Moderate right hydroureteronephrosis and mild left hydronephrosis better characterized at prior same day ultrasound.    Patent biliary stent.    < end of copied text >  < from: CT Abdomen and Pelvis No Cont (20 @ 14:46) >  IMPRESSION:    Secretions within the tracheobronchial tree as described above. Findings most consistent with multifocal pneumonia. Trace left pleural fluid.    Prominent colon. No small bowel dilatation. Wall thickening distal sigmoid colon. Consider bedside rectosigmoidoscopy. Differential diagnosis is given above.    Large volume ascites.    Moderate right hydroureteronephrosis and mild left hydronephrosis better characterized at prior same day ultrasound.    Patent biliary stent.    < end of copied text >    Assessment:  64M Pmhx pancreatic cancer (dx'ed 2020, S/p EGD EUS Stent placement in 2020 and Paracentesis at [facility] 2020), COPD, presenting from nursing home with hypotension, hyponatremia and SOB n/w pneumobilia and abdominal distension. This is likely related to previous ERCP and stent placement.     Plan:  - Recommend obtaining outside hospital records from [facility]  - No acute surgical intervention at this time as patient with multiple comorbidities and likely advanced disease considering malnourishment, cachexia, ascites. Recommend Palliative care consult.  -May consider triple phase CT if patient recovers from acute process  - PNA management as per primary team, please reconsult PRN    [facility]  [id number]

## 2020-08-21 NOTE — PROGRESS NOTE ADULT - PROBLEM SELECTOR PLAN 8
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  Name of PCP:  2.  PCP Contacted on Admission: [ ] Y    [x ] N  - evening admission, unclear who PCP is   3.  PCP contacted at Discharge: [ ] Y    [ ] N    [ ] N/A  4.  Post-Discharge Appointment Date and Location:  5.  Summary of Handoff given to PCP:

## 2020-08-21 NOTE — PROGRESS NOTE ADULT - PROBLEM SELECTOR PLAN 5
Patient with Na 125 on repeat labs. Likely multifactorial, 2/2 hypervolemic hyponatremia in s/o ascites + hyponatremia from malignancy. Unclear if patient was vomiting prior to admission, but could also be contributing.   - urine studies pending   - 1200 ml fluid restriction  - dose of albumin to improve intravascular volume status Patient with episodes of nausea + bilious emesis since admission, c/f obstruction   - pneumobilia found on abdominal ultrasound  - CT-chest, abdomen, pelvis pending  - surgery consulted Patient with episodes of nausea + bilious emesis c/f obstruction, but resolved s/p paracentesis   - pneumobilia found on abdominal ultrasound, per CT scan likely 2/2 prior biliary stent, per surgery no need for immediate surgical intervention

## 2020-08-21 NOTE — PROGRESS NOTE ADULT - PROBLEM SELECTOR PLAN 4
Multifactorial. Likely 2/2 h/o COPD + ascites   - TTE shows trace pericardial effusion + small bilateral pleural effusions, likely due to ascities   - paracentesis 8/21  - Continue supplemental nasal cannula oxygen with O2 sat goal >92  - continue home Symbicort  - duonebs PRN Per patient + daughter, has pancreatic cancer that was diagnosed in June 2020. Records from early August 2020 hospitalization show infiltrative pancreatic and biliary ductal dilitation on CT scan  - Oncology consulted   - for now, supportive care as per below  - Patient currently with poor insight into illness. Current status is full code. Will continue goals of care discussion  - nutrition consult Per patient + daughter, has pancreatic cancer that was diagnosed in June 2020. Records from early August 2020 hospitalization show infiltrative pancreatic and biliary ductal dilitation on CT scan  - Oncology consulted, not candidate for salvage therapy given comorbidities   -   - for now, supportive care as per below  - Patient currently with poor insight into illness. Current status is full code. Will continue goals of care discussion  - nutrition consult

## 2020-08-21 NOTE — DIETITIAN INITIAL EVALUATION ADULT. - REASON INDICATOR FOR ASSESSMENT
Consult received for assessment, education. Source: comprehensive chart review, pt. Pt is a 63 yo male with PMH of pancreatic ca (diagnosed 2020) and COPD, who presented from nursing home with hypotension, hyponatremia, and SOB, admitted 8/19 with hypoxic respiratory failure secondary to multifocal PNA. Pt also with large volume ascites s/p tap. Pt also with renal failure. "Overall poor prognosis as not viable candidate for salvage therapy and RRT."

## 2020-08-21 NOTE — CONSULT NOTE ADULT - CONSULT REQUESTED BY NAME
Dr. Juan Ramon Krishnan
Dr. Juan Ramon Krishnan
Dr. Krishnan
Juan Ramon Krishnan
Team 6
primary
team 6
Mitchell Gordon

## 2020-08-21 NOTE — PROGRESS NOTE ADULT - SUBJECTIVE AND OBJECTIVE BOX
PROGRESS NOTE:   ************************************************  Authored by: Mitchell Gordon MD PGY1  Pager: [phone]  *************************************************    Patient is a 64y old  Male who presents with a chief complaint of Hyponatremia + SOB (21 Aug 2020 07:11)      SUBJECTIVE / OVERNIGHT EVENTS: Overnight, patient with increased work of breathing with accessory muscle use due to difficulty clearing secretions. Given duonebs, was suctioned and given chest PT by nursing staff. MICU and nephro on board.  The patient was seen and examined at bedside.     REVIEW OF SYSTEMS:    CONSTITUTIONAL: No weakness, fevers or chills  EYES/ENT: No visual changes;  No vertigo or throat pain   NECK: No pain or stiffness  RESPIRATORY: No cough, wheezing, hemoptysis; No shortness of breath  CARDIOVASCULAR: No chest pain or palpitations  GASTROINTESTINAL: No abdominal or epigastric pain. No nausea, vomiting, or hematemesis; No diarrhea or constipation. No melena or hematochezia.  GENITOURINARY: No dysuria, frequency or hematuria  NEUROLOGICAL: No numbness or weakness  SKIN: No itching, rashes    MEDICATIONS  (STANDING):  albuterol/ipratropium for Nebulization 3 milliLiter(s) Nebulizer every 6 hours  apixaban 2.5 milliGRAM(s) Oral every 12 hours  budesonide  80 MICROgram(s)/formoterol 4.5 MICROgram(s) Inhaler 2 Puff(s) Inhalation two times a day  buprenorphine 8 mG/naloxone 2 mG SL  Tablet 1 Tablet(s) SubLingual <User Schedule>  metoclopramide 5 milliGRAM(s) Oral three times a day  midodrine 20 milliGRAM(s) Oral every 8 hours  pantoprazole    Tablet 40 milliGRAM(s) Oral before breakfast  piperacillin/tazobactam IVPB. 3.375 Gram(s) IV Intermittent once  piperacillin/tazobactam IVPB.. 3.375 Gram(s) IV Intermittent every 12 hours  silver sulfADIAZINE 1% Cream 1 Application(s) Topical two times a day  sodium bicarbonate  Infusion 0.242 mEq/kG/Hr (100 mL/Hr) IV Continuous <Continuous>  sodium zirconium cyclosilicate 10 Gram(s) Oral every 8 hours    MEDICATIONS  (PRN):  acetaminophen   Tablet .. 1000 milliGRAM(s) Oral every 6 hours PRN Temp greater or equal to 38C (100.4F), Moderate Pain (4 - 6)  magnesium hydroxide Suspension 30 milliLiter(s) Oral daily PRN Constipation  ondansetron Injectable 4 milliGRAM(s) IV Push every 8 hours PRN Nausea and/or Vomiting  simethicone 80 milliGRAM(s) Chew every 8 hours PRN Gas      CAPILLARY BLOOD GLUCOSE      POCT Blood Glucose.: 217 mg/dL (20 Aug 2020 12:30)    I&O's Summary    20 Aug 2020 07:01  -  21 Aug 2020 07:00  --------------------------------------------------------  IN: 0 mL / OUT: 150 mL / NET: -150 mL        PHYSICAL EXAM:  Vital Signs Last 24 Hrs  T(C): 36.3 (21 Aug 2020 06:54), Max: 37.1 (20 Aug 2020 18:18)  T(F): 97.3 (21 Aug 2020 06:54), Max: 98.8 (20 Aug 2020 18:18)  HR: 105 (21 Aug 2020 06:54) (10 - 105)  BP: 103/66 (21 Aug 2020 06:54) (83/45 - 105/65)  BP(mean): 66 (20 Aug 2020 18:59) (66 - 66)  RR: 22 (21 Aug 2020 06:54) (20 - 22)  SpO2: 95% (21 Aug 2020 06:54) (95% - 100%)    TELEMETRY:    CONSTITUTIONAL: NAD, well-developed  RESPIRATORY: Normal respiratory effort; lungs are clear to auscultation bilaterally  CARDIOVASCULAR: Regular rate and rhythm, normal S1 and S2, no murmur/rub/gallop; No lower extremity edema; Peripheral pulses are 2+ bilaterally  ABDOMEN: Nontender to palpation, normoactive bowel sounds, no rebound/guarding; No hepatosplenomegaly  MUSCLOSKELETAL: no clubbing or cyanosis of digits; no joint swelling or tenderness to palpation  PSYCH: A+O to person, place, and time; affect appropriate    LABS:                        10.3   8.12  )-----------( 210      ( 21 Aug 2020 07:00 )             31.1     08-20    124<L>  |  91<L>  |  91<H>  ----------------------------<  213<H>  4.9   |  18<L>  |  1.89<H>    Ca    8.7      20 Aug 2020 23:00  Phos  4.6     08-20  Mg     2.8     08-20    TPro  5.8<L>  /  Alb  3.1<L>  /  TBili  1.0  /  DBili  x   /  AST  23  /  ALT  23  /  AlkPhos  167<H>  08-20    PT/INR - ( 20 Aug 2020 12:27 )   PT: 19.0 sec;   INR: 1.63 ratio         PTT - ( 20 Aug 2020 12:27 )  PTT:31.4 sec      Urinalysis Basic - ( 20 Aug 2020 16:52 )    Color: Light Yellow / Appearance: Slightly Turbid / S.005 / pH: x  Gluc: x / Ketone: Negative  / Bili: Negative / Urobili: Negative   Blood: x / Protein: Negative / Nitrite: Negative   Leuk Esterase: Negative / RBC: 10 /hpf / WBC 0 /HPF   Sq Epi: x / Non Sq Epi: 0 /hpf / Bacteria: Negative        Culture - Body Fluid with Gram Stain (collected 21 Aug 2020 01:57)  Source: .Body Fluid Peritoneal Fluid- 1 anaerobic plus and 1 cup received.  Gram Stain (21 Aug 2020 05:11):    polymorphonuclear leukocytes seen    No organisms seen    by cytocentrifuge    Culture - Urine (collected 19 Aug 2020 21:05)  Source: .Urine Clean Catch (Midstream)  Final Report (20 Aug 2020 17:39):    <10,000 CFU/mL Normal Urogenital Kaia    Culture - Blood (collected 19 Aug 2020 20:52)  Source: .Blood Blood-Peripheral  Preliminary Report (20 Aug 2020 21:01):    No growth to date.    Culture - Blood (collected 19 Aug 2020 17:26)  Source: .Blood Blood-Peripheral  Preliminary Report (20 Aug 2020 18:01):    No growth to date.        RADIOLOGY & ADDITIONAL TESTS:  Results Reviewed:   Imaging Personally Reviewed:  Electrocardiogram Personally Reviewed:    COORDINATION OF CARE:  Care Discussed with Consultants/Other Providers [Y/N]:  Prior or Outpatient Records Reviewed [Y/N]: PROGRESS NOTE:   ************************************************  Authored by: Mitchell Gordon MD PGY1  Pager: [phone]  *************************************************    Patient is a 64y old  Male who presents with a chief complaint of Hyponatremia + SOB (21 Aug 2020 07:11)      SUBJECTIVE / OVERNIGHT EVENTS: Overnight, patient with increased work of breathing with accessory muscle use due to difficulty clearing secretions. Given duonebs, was suctioned and given chest PT by nursing staff. MICU and nephro on board.  The patient was seen and examined at bedside. PROGRESS NOTE:   ************************************************  Authored by: Mitchell Gordon MD PGY1  Pager: [phone]  *************************************************    Patient is a 64y old  Male who presents with a chief complaint of Hyponatremia + SOB (21 Aug 2020 07:39)      SUBJECTIVE / OVERNIGHT EVENTS: Overnight, patient with increased work of breathing with accessory muscle use due to difficulty clearing secretions. Given duonebs, was suctioned and given chest PT by nursing staff. MICU and nephro on board.  This morning patient reports some improvement in his breathing, though he is still very short of breath to the point of having difficulty talking, and has audible respiratory secretions that he is unable to clear. His also reports less nausea and has not been vomiting.     MEDICATIONS  (STANDING):  albuterol/ipratropium for Nebulization 3 milliLiter(s) Nebulizer every 6 hours  apixaban 2.5 milliGRAM(s) Oral every 12 hours  budesonide  80 MICROgram(s)/formoterol 4.5 MICROgram(s) Inhaler 2 Puff(s) Inhalation two times a day  buprenorphine 8 mG/naloxone 2 mG SL  Tablet 1 Tablet(s) SubLingual <User Schedule>  metoclopramide 5 milliGRAM(s) Oral three times a day  midodrine 20 milliGRAM(s) Oral every 8 hours  pantoprazole    Tablet 40 milliGRAM(s) Oral before breakfast  piperacillin/tazobactam IVPB.. 3.375 Gram(s) IV Intermittent every 12 hours  silver sulfADIAZINE 1% Cream 1 Application(s) Topical two times a day  sodium bicarbonate  Infusion 0.242 mEq/kG/Hr (100 mL/Hr) IV Continuous <Continuous>  sodium zirconium cyclosilicate 10 Gram(s) Oral every 8 hours    MEDICATIONS  (PRN):  acetaminophen   Tablet .. 1000 milliGRAM(s) Oral every 6 hours PRN Temp greater or equal to 38C (100.4F), Moderate Pain (4 - 6)  magnesium hydroxide Suspension 30 milliLiter(s) Oral daily PRN Constipation  ondansetron Injectable 4 milliGRAM(s) IV Push every 8 hours PRN Nausea and/or Vomiting  simethicone 80 milliGRAM(s) Chew every 8 hours PRN Gas      CAPILLARY BLOOD GLUCOSE      POCT Blood Glucose.: 217 mg/dL (20 Aug 2020 12:30)    I&O's Summary    20 Aug 2020 07:01  -  21 Aug 2020 07:00  --------------------------------------------------------  IN: 0 mL / OUT: 150 mL / NET: -150 mL        PHYSICAL EXAM:  Vital Signs Last 24 Hrs  T(C): 36.3 (21 Aug 2020 06:54), Max: 37.1 (20 Aug 2020 18:18)  T(F): 97.3 (21 Aug 2020 06:54), Max: 98.8 (20 Aug 2020 18:18)  HR: 105 (21 Aug 2020 06:54) (10 - 105)  BP: 103/66 (21 Aug 2020 06:54) (83/45 - 105/65)  BP(mean): 66 (20 Aug 2020 18:59) (66 - 66)  RR: 22 (21 Aug 2020 06:54) (20 - 22)  SpO2: 95% (21 Aug 2020 06:54) (95% - 100%)    TELEMETRY:    CONSTITUTIONAL: ill appearing   RESPIRATORY: Normal respiratory effort; lungs are clear to auscultation bilaterally  CARDIOVASCULAR: Regular rate and rhythm, normal S1 and S2, no murmur/rub/gallop; No lower extremity edema; Peripheral pulses are 2+ bilaterally  ABDOMEN: Nontender to palpation, normoactive bowel sounds, no rebound/guarding; No hepatosplenomegaly  MUSCLOSKELETAL: no clubbing or cyanosis of digits; no joint swelling or tenderness to palpation  PSYCH: A+O to person, place, and time; affect appropriate    LABS:                        10.3   8.12  )-----------( 210      ( 21 Aug 2020 07:00 )             31.1     08-21    125<L>  |  88<L>  |  92<H>  ----------------------------<  248<H>  4.7   |  20<L>  |  1.88<H>    Ca    8.5      21 Aug 2020 07:00  Phos  4.0     08  Mg     2.5     08-    TPro  5.4<L>  /  Alb  3.2<L>  /  TBili  1.1  /  DBili  x   /  AST  18  /  ALT  18  /  AlkPhos  132<H>  08-    PT/INR - ( 21 Aug 2020 07:00 )   PT: 18.0 sec;   INR: 1.54 ratio         PTT - ( 21 Aug 2020 07:00 )  PTT:32.0 sec      Urinalysis Basic - ( 20 Aug 2020 16:52 )    Color: Light Yellow / Appearance: Slightly Turbid / S.005 / pH: x  Gluc: x / Ketone: Negative  / Bili: Negative / Urobili: Negative   Blood: x / Protein: Negative / Nitrite: Negative   Leuk Esterase: Negative / RBC: 10 /hpf / WBC 0 /HPF   Sq Epi: x / Non Sq Epi: 0 /hpf / Bacteria: Negative        Culture - Body Fluid with Gram Stain (collected 21 Aug 2020 01:57)  Source: .Body Fluid Peritoneal Fluid- 1 anaerobic plus and 1 cup received.  Gram Stain (21 Aug 2020 05:11):    polymorphonuclear leukocytes seen    No organisms seen    by cytocentrifuge    Culture - Fungal, Body Fluid (collected 21 Aug 2020 01:51)  Source: .Body Fluid Peritoneal Fluid-1 aerobic plus bottle received  Preliminary Report (21 Aug 2020 09:20):    Testing in progress    Culture - Urine (collected 19 Aug 2020 21:05)  Source: .Urine Clean Catch (Midstream)  Final Report (20 Aug 2020 17:39):    <10,000 CFU/mL Normal Urogenital Kaia    Culture - Blood (collected 19 Aug 2020 20:52)  Source: .Blood Blood-Peripheral  Preliminary Report (20 Aug 2020 21:01):    No growth to date.    Culture - Blood (collected 19 Aug 2020 17:26)  Source: .Blood Blood-Peripheral  Preliminary Report (20 Aug 2020 18:01):    No growth to date.        RADIOLOGY & ADDITIONAL TESTS:  Results Reviewed:   Imaging Personally Reviewed:  Electrocardiogram Personally Reviewed:    COORDINATION OF CARE:  Care Discussed with Consultants/Other Providers [Y/N]:  Prior or Outpatient Records Reviewed [Y/N]: PROGRESS NOTE:   ************************************************  Authored by: Mitchell Gordon MD PGY1  Pager: [phone]  *************************************************    Patient is a 64y old  Male who presents with a chief complaint of Hyponatremia + SOB (21 Aug 2020 07:39)      SUBJECTIVE / OVERNIGHT EVENTS: Overnight, patient with increased work of breathing with accessory muscle use due to difficulty clearing secretions. Given duonebs, was suctioned and given chest PT by nursing staff. MICU and nephro on board.  This morning patient reports some improvement in his breathing, though he is still very short of breath to the point of having difficulty talking, and has audible respiratory secretions that he is unable to clear. Also reports less nausea and has not been vomiting.       MEDICATIONS  (STANDING):  albuterol/ipratropium for Nebulization 3 milliLiter(s) Nebulizer every 6 hours  apixaban 2.5 milliGRAM(s) Oral every 12 hours  budesonide  80 MICROgram(s)/formoterol 4.5 MICROgram(s) Inhaler 2 Puff(s) Inhalation two times a day  buprenorphine 8 mG/naloxone 2 mG SL  Tablet 1 Tablet(s) SubLingual <User Schedule>  metoclopramide 5 milliGRAM(s) Oral three times a day  midodrine 20 milliGRAM(s) Oral every 8 hours  pantoprazole    Tablet 40 milliGRAM(s) Oral before breakfast  piperacillin/tazobactam IVPB.. 3.375 Gram(s) IV Intermittent every 12 hours  silver sulfADIAZINE 1% Cream 1 Application(s) Topical two times a day  sodium bicarbonate  Infusion 0.242 mEq/kG/Hr (100 mL/Hr) IV Continuous <Continuous>  sodium zirconium cyclosilicate 10 Gram(s) Oral every 8 hours    MEDICATIONS  (PRN):  acetaminophen   Tablet .. 1000 milliGRAM(s) Oral every 6 hours PRN Temp greater or equal to 38C (100.4F), Moderate Pain (4 - 6)  magnesium hydroxide Suspension 30 milliLiter(s) Oral daily PRN Constipation  ondansetron Injectable 4 milliGRAM(s) IV Push every 8 hours PRN Nausea and/or Vomiting  simethicone 80 milliGRAM(s) Chew every 8 hours PRN Gas      CAPILLARY BLOOD GLUCOSE      POCT Blood Glucose.: 217 mg/dL (20 Aug 2020 12:30)    I&O's Summary    20 Aug 2020 07:01  -  21 Aug 2020 07:00  --------------------------------------------------------  IN: 0 mL / OUT: 150 mL / NET: -150 mL        PHYSICAL EXAM:  Vital Signs Last 24 Hrs  T(C): 36.3 (21 Aug 2020 06:54), Max: 37.1 (20 Aug 2020 18:18)  T(F): 97.3 (21 Aug 2020 06:54), Max: 98.8 (20 Aug 2020 18:18)  HR: 105 (21 Aug 2020 06:54) (10 - 105)  BP: 103/66 (21 Aug 2020 06:54) (83/45 - 105/65)  BP(mean): 66 (20 Aug 2020 18:59) (66 - 66)  RR: 22 (21 Aug 2020 06:54) (20 - 22)  SpO2: 95% (21 Aug 2020 06:54) (95% - 100%)    TELEMETRY:    CONSTITUTIONAL: ill appearing   RESPIRATORY: diffuse course, ronchorous breath sounds bilaterally, worse at lung bases  CARDIOVASCULAR: Regular rate and rhythm, normal S1 and S2, no murmur/rub/gallop; 1-2+ lower extremity edema  ABDOMEN: Decreased distention s/p paracentesis, but still very distended with skin tightness. Palpable mass in RUQ. Nontender to palpation, normoactive bowel sounds, no rebound/guarding  PSYCH: difficult to assess, patient difficulty talking to SOB, appears mental status appears unchanged.     LABS:                        10.3   8.12  )-----------( 210      ( 21 Aug 2020 07:00 )             31.1     08-21    125<L>  |  88<L>  |  92<H>  ----------------------------<  248<H>  4.7   |  20<L>  |  1.88<H>    Ca    8.5      21 Aug 2020 07:00  Phos  4.0     08  Mg     2.5     08-    TPro  5.4<L>  /  Alb  3.2<L>  /  TBili  1.1  /  DBili  x   /  AST  18  /  ALT  18  /  AlkPhos  132<H>  08-    PT/INR - ( 21 Aug 2020 07:00 )   PT: 18.0 sec;   INR: 1.54 ratio         PTT - ( 21 Aug 2020 07:00 )  PTT:32.0 sec      Urinalysis Basic - ( 20 Aug 2020 16:52 )    Color: Light Yellow / Appearance: Slightly Turbid / S.005 / pH: x  Gluc: x / Ketone: Negative  / Bili: Negative / Urobili: Negative   Blood: x / Protein: Negative / Nitrite: Negative   Leuk Esterase: Negative / RBC: 10 /hpf / WBC 0 /HPF   Sq Epi: x / Non Sq Epi: 0 /hpf / Bacteria: Negative        Culture - Body Fluid with Gram Stain (collected 21 Aug 2020 01:57)  Source: .Body Fluid Peritoneal Fluid- 1 anaerobic plus and 1 cup received.  Gram Stain (21 Aug 2020 05:11):    polymorphonuclear leukocytes seen    No organisms seen    by cytocentrifuge    Culture - Fungal, Body Fluid (collected 21 Aug 2020 01:51)  Source: .Body Fluid Peritoneal Fluid-1 aerobic plus bottle received  Preliminary Report (21 Aug 2020 09:20):    Testing in progress    Culture - Urine (collected 19 Aug 2020 21:05)  Source: .Urine Clean Catch (Midstream)  Final Report (20 Aug 2020 17:39):    <10,000 CFU/mL Normal Urogenital Kaia    Culture - Blood (collected 19 Aug 2020 20:52)  Source: .Blood Blood-Peripheral  Preliminary Report (20 Aug 2020 21:01):    No growth to date.    Culture - Blood (collected 19 Aug 2020 17:26)  Source: .Blood Blood-Peripheral  Preliminary Report (20 Aug 2020 18:01):    No growth to date.        RADIOLOGY & ADDITIONAL TESTS:  Results Reviewed:   Imaging Personally Reviewed:  Electrocardiogram Personally Reviewed:    COORDINATION OF CARE:  Care Discussed with Consultants/Other Providers [Y/N]:  Prior or Outpatient Records Reviewed [Y/N]: PROGRESS NOTE:   ************************************************  Authored by: Mitchell Gordon MD PGY1  Pager: [phone]  *************************************************    Patient is a 64y old  Male who presents with a chief complaint of Hyponatremia + SOB (21 Aug 2020 07:39)      SUBJECTIVE / OVERNIGHT EVENTS: Overnight, patient with increased work of breathing with accessory muscle use due to difficulty clearing secretions. Given duonebs, was suctioned and given chest PT by nursing staff. MICU and nephro on board.  This morning patient reports some improvement in his breathing, though he is still very short of breath to the point of having difficulty talking, and has audible respiratory secretions that he is unable to clear. Also reports less nausea and has not been vomiting.       MEDICATIONS  (STANDING):  albuterol/ipratropium for Nebulization 3 milliLiter(s) Nebulizer every 6 hours  apixaban 2.5 milliGRAM(s) Oral every 12 hours  budesonide  80 MICROgram(s)/formoterol 4.5 MICROgram(s) Inhaler 2 Puff(s) Inhalation two times a day  buprenorphine 8 mG/naloxone 2 mG SL  Tablet 1 Tablet(s) SubLingual <User Schedule>  metoclopramide 5 milliGRAM(s) Oral three times a day  midodrine 20 milliGRAM(s) Oral every 8 hours  pantoprazole    Tablet 40 milliGRAM(s) Oral before breakfast  piperacillin/tazobactam IVPB.. 3.375 Gram(s) IV Intermittent every 12 hours  silver sulfADIAZINE 1% Cream 1 Application(s) Topical two times a day  sodium bicarbonate  Infusion 0.242 mEq/kG/Hr (100 mL/Hr) IV Continuous <Continuous>  sodium zirconium cyclosilicate 10 Gram(s) Oral every 8 hours    MEDICATIONS  (PRN):  acetaminophen   Tablet .. 1000 milliGRAM(s) Oral every 6 hours PRN Temp greater or equal to 38C (100.4F), Moderate Pain (4 - 6)  magnesium hydroxide Suspension 30 milliLiter(s) Oral daily PRN Constipation  ondansetron Injectable 4 milliGRAM(s) IV Push every 8 hours PRN Nausea and/or Vomiting  simethicone 80 milliGRAM(s) Chew every 8 hours PRN Gas      CAPILLARY BLOOD GLUCOSE      POCT Blood Glucose.: 217 mg/dL (20 Aug 2020 12:30)    I&O's Summary    20 Aug 2020 07:01  -  21 Aug 2020 07:00  --------------------------------------------------------  IN: 0 mL / OUT: 150 mL / NET: -150 mL        PHYSICAL EXAM:  Vital Signs Last 24 Hrs  T(C): 36.3 (21 Aug 2020 06:54), Max: 37.1 (20 Aug 2020 18:18)  T(F): 97.3 (21 Aug 2020 06:54), Max: 98.8 (20 Aug 2020 18:18)  HR: 105 (21 Aug 2020 06:54) (10 - 105)  BP: 103/66 (21 Aug 2020 06:54) (83/45 - 105/65)  BP(mean): 66 (20 Aug 2020 18:59) (66 - 66)  RR: 22 (21 Aug 2020 06:54) (20 - 22)  SpO2: 95% (21 Aug 2020 06:54) (95% - 100%)    TELEMETRY:    CONSTITUTIONAL: ill appearing, cachectic, SOB  HEENT: temporal muscle wasting  RESPIRATORY: diffuse course, ronchorous breath sounds bilaterally, worse at lung bases  CARDIOVASCULAR: Regular rate and rhythm, normal S1 and S2, no murmur/rub/gallop; 1-2+ pitting lower extremity edema  ABDOMEN: Decreased distention s/p paracentesis, but still very distended with skin tightness. Liver palpable in RUQ. Nontender to palpation, normoactive bowel sounds, no rebound/guarding  PSYCH: difficult to assess, patient with difficulty talking due to SOB, appears mental status appears unchanged.     LABS:                        10.3   8.12  )-----------( 210      ( 21 Aug 2020 07:00 )             31.1     08-21    125<L>  |  88<L>  |  92<H>  ----------------------------<  248<H>  4.7   |  20<L>  |  1.88<H>    Ca    8.5      21 Aug 2020 07:00  Phos  4.0     08-  Mg     2.5     08-21    TPro  5.4<L>  /  Alb  3.2<L>  /  TBili  1.1  /  DBili  x   /  AST  18  /  ALT  18  /  AlkPhos  132<H>  08-21    PT/INR - ( 21 Aug 2020 07:00 )   PT: 18.0 sec;   INR: 1.54 ratio         PTT - ( 21 Aug 2020 07:00 )  PTT:32.0 sec      Urinalysis Basic - ( 20 Aug 2020 16:52 )    Color: Light Yellow / Appearance: Slightly Turbid / S.005 / pH: x  Gluc: x / Ketone: Negative  / Bili: Negative / Urobili: Negative   Blood: x / Protein: Negative / Nitrite: Negative   Leuk Esterase: Negative / RBC: 10 /hpf / WBC 0 /HPF   Sq Epi: x / Non Sq Epi: 0 /hpf / Bacteria: Negative        Culture - Body Fluid with Gram Stain (collected 21 Aug 2020 01:57)  Source: .Body Fluid Peritoneal Fluid- 1 anaerobic plus and 1 cup received.  Gram Stain (21 Aug 2020 05:11):    polymorphonuclear leukocytes seen    No organisms seen    by cytocentrifuge    Culture - Fungal, Body Fluid (collected 21 Aug 2020 01:51)  Source: .Body Fluid Peritoneal Fluid-1 aerobic plus bottle received  Preliminary Report (21 Aug 2020 09:20):    Testing in progress    Culture - Urine (collected 19 Aug 2020 21:05)  Source: .Urine Clean Catch (Midstream)  Final Report (20 Aug 2020 17:39):    <10,000 CFU/mL Normal Urogenital Kaia    Culture - Blood (collected 19 Aug 2020 20:52)  Source: .Blood Blood-Peripheral  Preliminary Report (20 Aug 2020 21:01):    No growth to date.    Culture - Blood (collected 19 Aug 2020 17:26)  Source: .Blood Blood-Peripheral  Preliminary Report (20 Aug 2020 18:01):    No growth to date.        RADIOLOGY & ADDITIONAL TESTS:  Results Reviewed:   Imaging Personally Reviewed:  Electrocardiogram Personally Reviewed:    COORDINATION OF CARE:  Care Discussed with Consultants/Other Providers [Y/N]:  Prior or Outpatient Records Reviewed [Y/N]: PROGRESS NOTE:   ************************************************  Authored by: Mitchell Gordon MD PGY1  Pager: [phone]  *************************************************    Patient is a 64y old  Male who presents with a chief complaint of Hyponatremia + SOB (21 Aug 2020 07:39)      SUBJECTIVE / OVERNIGHT EVENTS: Overnight, patient with increased work of breathing with accessory muscle use due to difficulty clearing secretions. Given duonebs, was suctioned and given chest PT by nursing staff. MICU and nephro on board.  This morning patient reports some improvement in his breathing, though he is still very short of breath to the point of having difficulty talking, and has audible respiratory secretions that he is unable to clear. Also reports less nausea and has not been vomiting.     On  at 16:00 had goals of care conversation with healthcare proxy pt's daughter. She was made aware of pt's poor prognosis and that he is not currently a viable candidate for chemotherapy. Spoke at length about palliative and hospice care. She was on board with consulting palliative care, and stated that she would speak with her mom/pt's wife further about reevaluating code status, but that she did not wish to make any changes at the moment.       MEDICATIONS  (STANDING):  albuterol/ipratropium for Nebulization 3 milliLiter(s) Nebulizer every 6 hours  apixaban 2.5 milliGRAM(s) Oral every 12 hours  budesonide  80 MICROgram(s)/formoterol 4.5 MICROgram(s) Inhaler 2 Puff(s) Inhalation two times a day  buprenorphine 8 mG/naloxone 2 mG SL  Tablet 1 Tablet(s) SubLingual <User Schedule>  metoclopramide 5 milliGRAM(s) Oral three times a day  midodrine 20 milliGRAM(s) Oral every 8 hours  pantoprazole    Tablet 40 milliGRAM(s) Oral before breakfast  piperacillin/tazobactam IVPB.. 3.375 Gram(s) IV Intermittent every 12 hours  silver sulfADIAZINE 1% Cream 1 Application(s) Topical two times a day  sodium bicarbonate  Infusion 0.242 mEq/kG/Hr (100 mL/Hr) IV Continuous <Continuous>  sodium zirconium cyclosilicate 10 Gram(s) Oral every 8 hours    MEDICATIONS  (PRN):  acetaminophen   Tablet .. 1000 milliGRAM(s) Oral every 6 hours PRN Temp greater or equal to 38C (100.4F), Moderate Pain (4 - 6)  magnesium hydroxide Suspension 30 milliLiter(s) Oral daily PRN Constipation  ondansetron Injectable 4 milliGRAM(s) IV Push every 8 hours PRN Nausea and/or Vomiting  simethicone 80 milliGRAM(s) Chew every 8 hours PRN Gas      CAPILLARY BLOOD GLUCOSE      POCT Blood Glucose.: 217 mg/dL (20 Aug 2020 12:30)    I&O's Summary    20 Aug 2020 07:01  -  21 Aug 2020 07:00  --------------------------------------------------------  IN: 0 mL / OUT: 150 mL / NET: -150 mL        PHYSICAL EXAM:  Vital Signs Last 24 Hrs  T(C): 36.3 (21 Aug 2020 06:54), Max: 37.1 (20 Aug 2020 18:18)  T(F): 97.3 (21 Aug 2020 06:54), Max: 98.8 (20 Aug 2020 18:18)  HR: 105 (21 Aug 2020 06:54) (10 - 105)  BP: 103/66 (21 Aug 2020 06:54) (83/45 - 105/65)  BP(mean): 66 (20 Aug 2020 18:59) (66 - 66)  RR: 22 (21 Aug 2020 06:54) (20 - 22)  SpO2: 95% (21 Aug 2020 06:54) (95% - 100%)    TELEMETRY:    CONSTITUTIONAL: ill appearing, cachectic, SOB  HEENT: temporal muscle wasting  RESPIRATORY: diffuse course, ronchorous breath sounds bilaterally, worse at lung bases  CARDIOVASCULAR: Regular rate and rhythm, normal S1 and S2, no murmur/rub/gallop; 1-2+ pitting lower extremity edema  ABDOMEN: Decreased distention s/p paracentesis, but still very distended with skin tightness. Liver palpable in RUQ. Nontender to palpation, normoactive bowel sounds, no rebound/guarding  PSYCH: difficult to assess, patient with difficulty talking due to SOB, appears mental status appears unchanged.     LABS:                        10.3   8.12  )-----------( 210      ( 21 Aug 2020 07:00 )             31.1     08-21    125<L>  |  88<L>  |  92<H>  ----------------------------<  248<H>  4.7   |  20<L>  |  1.88<H>    Ca    8.5      21 Aug 2020 07:00  Phos  4.0     08-  Mg     2.5     08-21    TPro  5.4<L>  /  Alb  3.2<L>  /  TBili  1.1  /  DBili  x   /  AST  18  /  ALT  18  /  AlkPhos  132<H>  08-21    PT/INR - ( 21 Aug 2020 07:00 )   PT: 18.0 sec;   INR: 1.54 ratio         PTT - ( 21 Aug 2020 07:00 )  PTT:32.0 sec      Urinalysis Basic - ( 20 Aug 2020 16:52 )    Color: Light Yellow / Appearance: Slightly Turbid / S.005 / pH: x  Gluc: x / Ketone: Negative  / Bili: Negative / Urobili: Negative   Blood: x / Protein: Negative / Nitrite: Negative   Leuk Esterase: Negative / RBC: 10 /hpf / WBC 0 /HPF   Sq Epi: x / Non Sq Epi: 0 /hpf / Bacteria: Negative        Culture - Body Fluid with Gram Stain (collected 21 Aug 2020 01:57)  Source: .Body Fluid Peritoneal Fluid- 1 anaerobic plus and 1 cup received.  Gram Stain (21 Aug 2020 05:11):    polymorphonuclear leukocytes seen    No organisms seen    by cytocentrifuge    Culture - Fungal, Body Fluid (collected 21 Aug 2020 01:51)  Source: .Body Fluid Peritoneal Fluid-1 aerobic plus bottle received  Preliminary Report (21 Aug 2020 09:20):    Testing in progress    Culture - Urine (collected 19 Aug 2020 21:05)  Source: .Urine Clean Catch (Midstream)  Final Report (20 Aug 2020 17:39):    <10,000 CFU/mL Normal Urogenital Kaia    Culture - Blood (collected 19 Aug 2020 20:52)  Source: .Blood Blood-Peripheral  Preliminary Report (20 Aug 2020 21:01):    No growth to date.    Culture - Blood (collected 19 Aug 2020 17:26)  Source: .Blood Blood-Peripheral  Preliminary Report (20 Aug 2020 18:01):    No growth to date.        RADIOLOGY & ADDITIONAL TESTS:  Results Reviewed:   Imaging Personally Reviewed:  Electrocardiogram Personally Reviewed:    COORDINATION OF CARE:  Care Discussed with Consultants/Other Providers [Y/N]:  Prior or Outpatient Records Reviewed [Y/N]:

## 2020-08-21 NOTE — PROVIDER CONTACT NOTE (CHANGE IN STATUS NOTIFICATION) - ACTION/TREATMENT ORDERED:
Dr Cruz notified and made aware of pts change in status, no further tx ordered. will continue to monitor Dr Cruz notified and made aware of pts change in status. pt has MOLST, pt is DNR/DNI. no new orders at this time. will continue to monitor

## 2020-08-21 NOTE — CONSULT NOTE ADULT - SUBJECTIVE AND OBJECTIVE BOX
Patient is a 64y old  Male who presents with a chief complaint of Hyponatremia + SOB (21 Aug 2020 15:09)    HPI:  64M Pmhx pancreatic cancer (dx'ed June 2020), COPD, presenting from nursing home with hypotension, hyponatremia and SOB.     History is per nursing home chart, patient, and pt's daughter. Patient presents from nursing home after found to be hypotensive to 90's systolic. Upon arrival to ED, BP was 110 systolic, but patient was SOB with ascites and found to be hyponatremic to 125. Per pt's daughter patient was diagnosed with pancreatic cancer in June 2020 at [facility], and may have started to undergo treatment, but for unclear reasons was discharged to nursing home and lost to follow up. When interviewed, patient was nauseas and vomiting bilious emesis. He reports that his nausea, abdominal distention, and SOB started 2-3 weeks ago. He also endorses generalized abdominal pain that is worse in RUQ, and lower extremity edema. He denies fever, chills, dizziness, headache, chest pain. He is unsure of further details and prognosis regarding his pancreatic cancer. He was found to be hyponatremic to 125, and was satting 99% on 3L NC. (19 Aug 2020 19:54)    Podiatry consulted for ingrown toenails bilat.    PAST MEDICAL & SURGICAL HISTORY:  Ascites  Pancreatic cancer  Simple chronic bronchitis: with home o2 PRN  Essential hypertension  Drug abuse  S/P hip replacement, right      MEDICATIONS  (STANDING):  albuterol/ipratropium for Nebulization 3 milliLiter(s) Nebulizer every 6 hours  citric acid/sodium citrate Solution 30 milliLiter(s) Oral two times a day  HYDROmorphone  Injectable 1 milliGRAM(s) IV Push every 4 hours  piperacillin/tazobactam IVPB.. 3.375 Gram(s) IV Intermittent every 12 hours  silver sulfADIAZINE 1% Cream 1 Application(s) Topical two times a day  sodium chloride 3%  Inhalation 3 milliLiter(s) Inhalation two times a day    MEDICATIONS  (PRN):  acetaminophen  Suppository .. 650 milliGRAM(s) Rectal every 6 hours PRN Temp greater or equal to 38C (100.4F)  bisacodyl Suppository 10 milliGRAM(s) Rectal daily PRN Constipation  HYDROmorphone  Injectable 2 milliGRAM(s) IV Push every 2 hours PRN pain  LORazepam   Injectable 0.5 milliGRAM(s) IV Push every 4 hours PRN Anxiety/agitation  ondansetron Injectable 4 milliGRAM(s) IV Push every 8 hours PRN Nausea and/or Vomiting      Allergies    No Known Allergies    Intolerances        VITALS:    Vital Signs Last 24 Hrs  T(C): 36.9 (21 Aug 2020 20:35), Max: 36.9 (21 Aug 2020 20:35)  T(F): 98.4 (21 Aug 2020 20:35), Max: 98.4 (21 Aug 2020 20:35)  HR: 115 (21 Aug 2020 20:35) (100 - 117)  BP: 42/29 (21 Aug 2020 20:35) (42/29 - 103/66)  BP(mean): --  RR: 36 (21 Aug 2020 20:35) (20 - 39)  SpO2: 100% (21 Aug 2020 20:35) (85% - 100%)    LABS:                          10.3   8.12  )-----------( 210      ( 21 Aug 2020 07:00 )             31.1       08-21    125<L>  |  88<L>  |  92<H>  ----------------------------<  248<H>  4.7   |  20<L>  |  1.88<H>    Ca    8.5      21 Aug 2020 07:00  Phos  4.0     08-21  Mg     2.5     08-21    TPro  5.4<L>  /  Alb  3.2<L>  /  TBili  1.1  /  DBili  x   /  AST  18  /  ALT  18  /  AlkPhos  132<H>  08-21      CAPILLARY BLOOD GLUCOSE          PT/INR - ( 21 Aug 2020 07:00 )   PT: 18.0 sec;   INR: 1.54 ratio         PTT - ( 21 Aug 2020 07:00 )  PTT:32.0 sec    LOWER EXTREMITY PHYSICAL EXAM:    Vasular: DP/PT 0/4, B/L, CFT <3 seconds B/L, Temperature gradient WNL, B/L.   Neuro: unable to assess  Musculoskeletal/Ortho: hammertoes 2-5 toes bilat  Skin: ingrown 2nd toenails bilat with no signs of infection, elongated discolored toenails x10

## 2020-08-21 NOTE — PROGRESS NOTE ADULT - ATTENDING COMMENTS
pt seen and examined.  above plan discussed on rounds today.  In addition,    64M Pmhx pancreatic cancer (dx'ed June 2020), COPD, presenting from nursing home with hypotension, hyponatremia and SOB with worsening hypoxia, likely 2/2 ascites and volume overload from advanced pancreatic cancer.     Acute on chronic hypoxic respiratory failure- presumed to be from lung restriction. planned to a therapeutic para today. titrate down oxygen as tolerated  Acute renal failure, metabolic acidosis - causes included obstruction, intravascular volume depletion, hepatorenal syndrome. will give albumin for intravascular repletion, will consider giving midodrine, octreotide and albumin to treat for hepatorenal syndrome.  Ascites- suspect 2/2 pancreatic cancer, malignancy. However no prior hx or imaging on patient. will f/u the paracentesis results and eval the SAAG.   Pancreatic ca- will get pan scan to assess for progression of disease. will f/u outpt records  Elevated troponins - the change is troponin is only 4. elevated troponins is likely a results of demand ischemia in the setting of hypotension and hypoperfusion.   patient is very ill will a poor prognosis.  will continue all supportive measures.
pt seen and examined.  above plan discussed on rounds today.  In addition,    64M Pmhx pancreatic cancer (dx'ed June 2020), COPD, presenting from nursing home with hypotension, hyponatremia and SOB with worsening hypoxia, likely 2/2 ascites and volume overload from advanced pancreatic cancer.     Acute on chronic hypoxic respiratory failure- pt still continues to be hypoxic and in fact has worsened. Pt is now requiring High Flow Nasal Cannula. Given his increased phelgm it is presumed that his shortness is due to pneumonia. Abx escalated to Zosyn. c/w nebs, c/w chest pt. c/w deep suctioning as tolerated.   Acute renal failure, metabolic acidosis - Acidosis improved with bicarb gtt. bicarb stopped per renal recs and pt started on Bicitra. will start treatment for possible hepatorenal syndrome, will start midodrine, octreotide and albumin.  Ascites- pt is s/p paracentesis, his SAAG is c/w a transudative effusion which is c/w ascites from his cirrhosis.   Pancreatic ca- Onc eval appreciated. given patients poor functional status he will not be a candidate for disease modifying treatment. Palliative care eval and referral were made.    patient is very ill with a poor prognosis.  MICU eval appreciated. --> given his poor prognosis patient was made DNR/DNI.   will continue all supportive measures.

## 2020-08-21 NOTE — DIETITIAN INITIAL EVALUATION ADULT. - PROBLEM SELECTOR PLAN 1
Per patient + daughter, has pancreatic cancer that was diagnosed in June 2020 at [facility], no current access to records of treatment for more detail. Patient cachectic and with ascites c/w a diagnosis of pancreatic cancer  - Will consult Oncology   - for now, supportive care as per below  - Patient currently with poor insight into illness. Current status is full code. Will continue goals of care discussion  - nutrition consult

## 2020-08-21 NOTE — DIETITIAN INITIAL EVALUATION ADULT. - PROBLEM SELECTOR PLAN 4
Patient with Na 125 on admission. Likely multifactorial, 2/2 hypervolemic hyponatremia in s/o ascites + hyponatremia from malignancy. Unclear if patient was vomiting prior to admission, but could also be contributing.  - will try to obtain records to assess whether this is a chronic or acute issue   - repeat BMP put in at ~21:30. if Na+ downtrending, start IV NS @75ml/hr until AM labs  - 1 L fluid restriction

## 2020-08-21 NOTE — PROGRESS NOTE ADULT - ASSESSMENT
Patient is a 63 y/o M w/ a PMHx of recently diagnosed pancreatic cancer and COPD who was sent to the ED from a NH with hypotension, hyponatremia, and SOB, found to have worsening hypoxia in the setting of abdominal ascites, hyponatremia, and ANNMARIE, and was admitted to Medicine for further management, s/p paracentesis on 8/20 (2750cc were removed), w/ hospital course c/b worsening acidosis. Oncology consulted for further evaluation.    # Dyspnea  - Likely multifactorial in the setting worsening abdominal ascites, multifocal PNA, and underlying COPD. Of note, patient had a paracentesis at [facility] on 8/10 (5.3L were removed).   - Abdominal U/S (8/20) was notable for liver cirrhosis w/ portal hypertension and moderate volume ascites  - CT C/A/P (8/20): Secretions within the tracheobronchial tree as described above. Findings most consistent with multifocal pneumonia.   - Patient is s/p paracentesis on 8/20 (2750cc were removed)  - C/w Duonebs PRN and Symbicort. C/w antibiotics as per primary team  - Wean supplemental O2 as tolerated    # ANNMARIE  - Associated with hyponatremia, hyperkalemia, and acidosis. Of note, patient's Cr was WNL earlier this month (0.9)  - Appreciate Nephrology evaluation. Follow-up recs  - Monitor BMP    # Pancreatic cancer  - Recently Dx in 6/2020. Per d/w patient's daughter and review of OSH records, patient was admitted to [facility] in 6/2020 for jaundice and was found to have an infiltrative pancreatic head mass leading to pancreatic and biliary ductal dilatation. Patient underwent ERCP w/ biliary stent placement and s/p FNA of the mass with pathology showing adenocarcinoma. Patient never met with Oncology prior to this hospitalization. He is treatment naive.  - Given patient's declining functional status, poor nutrition, recurrent ascites, and acute illness, he is not a candidate for DMT at this time  - Palliative Care consulted for assistance with GOC. Follow-up recs  - Appreciate care as per primary team    Kyle Razo, PGY5  Hematology-Oncology Fellow  Pager: [phone] / [phone]
64M Pmhx pancreatic cancer (dx'ed June 2020) and COPD, admitted with hypoxic respiratory failure 2/2 multifocal PNA with antibiotics broadened to zosyn, large volume ascites s/p 2.75 L tap w/o SBP + SAAG c/w portal hypertension, and renal failure + hyponatremia unstable but with some improvement s/p bicarb drip + albumin. Overall poor prognosis as not viable candidate for salvage therapy and RRT, full code with goals of care conversation ongoing.
64M Pmhx pancreatic cancer (dx'ed June 2020), COPD, presenting from nursing home with hypotension, hyponatremia and SOB, likely 2/2 ascites from pancreatic cancer, with soft blood pressures, worsening ANNMARIE, hyponatremia, and continued shortness of breath and nausea.

## 2020-08-21 NOTE — ADVANCED PRACTICE NURSE CONSULT - REASON FOR CONSULT
Requested by staff to assess skin status of pt a/w wounds to the BLE. PMH is noted:  64M Pmhx pancreatic cancer (dx'ed June 2020) and COPD, admitted with hypoxic respiratory failure 2/2 multifocal PNA with antibiotics broadened to zosyn, large volume ascites s/p 2.75 L tap w/o SBP + SAAG c/w portal hypertension, and renal failure + hyponatremia unstable but with some improvement s/p bicarb drip + albumin. Overall poor prognosis as not viable candidate for salvage therapy and RRT, full code with goals of care conversation ongoing.     A Palliative Care consult is pending.

## 2020-08-21 NOTE — PROGRESS NOTE ADULT - SUBJECTIVE AND OBJECTIVE BOX
Patient is a 64y old  Male who presents with a chief complaint of Hyponatremia + SOB (21 Aug 2020 07:39)    SUBJECTIVE / OVERNIGHT EVENTS:  Patient s/p paracentesis yesterday (2750cc were removed). His labs were notable for a worsening acidosis for which patient was started on a bicarbonate gtt. Patient's records from [facility] were obtained from primary team and were placed in patient's chart. Outside records were reviewed. Patient had a CT A+P on  which showed an "infiltrative pancreatic head mass, leading to pancreatic and biliary ductal dilatation. There was also a filling defect within the IVC suspicious for thrombus. Patient underwent a therapeutic paracentesis on 8/10 (5.3L were removed). His Cr was noted to be WNL (0.9). Though no specific pathology report was included, there is a note that states that patient underwent an ERCP and EUS and that an FNA of the mass was consistent with adenocarcinoma.    Patient seen this AM. He was sitting up in bed and his breathing appeared labored. His primary complaint was dyspnea. No significant chest pain or abdominal pain currently. Patient was afebrile overnight.    MEDICATIONS  (STANDING):  albuterol/ipratropium for Nebulization 3 milliLiter(s) Nebulizer every 6 hours  apixaban 2.5 milliGRAM(s) Oral every 12 hours  budesonide  80 MICROgram(s)/formoterol 4.5 MICROgram(s) Inhaler 2 Puff(s) Inhalation two times a day  buprenorphine 8 mG/naloxone 2 mG SL  Tablet 1 Tablet(s) SubLingual <User Schedule>  citric acid/sodium citrate Solution 30 milliLiter(s) Oral two times a day  metoclopramide 5 milliGRAM(s) Oral three times a day  midodrine 20 milliGRAM(s) Oral every 8 hours  piperacillin/tazobactam IVPB.. 3.375 Gram(s) IV Intermittent every 12 hours  silver sulfADIAZINE 1% Cream 1 Application(s) Topical two times a day  sodium chloride 3%  Inhalation 3 milliLiter(s) Inhalation two times a day  sodium zirconium cyclosilicate 10 Gram(s) Oral every 8 hours    MEDICATIONS  (PRN):  acetaminophen   Tablet .. 1000 milliGRAM(s) Oral every 6 hours PRN Temp greater or equal to 38C (100.4F), Moderate Pain (4 - 6)  magnesium hydroxide Suspension 30 milliLiter(s) Oral daily PRN Constipation  ondansetron Injectable 4 milliGRAM(s) IV Push every 8 hours PRN Nausea and/or Vomiting  simethicone 80 milliGRAM(s) Chew every 8 hours PRN Gas        CAPILLARY BLOOD GLUCOSE        I&O's Summary    20 Aug 2020 07:01  -  21 Aug 2020 07:00  --------------------------------------------------------  IN: 0 mL / OUT: 150 mL / NET: -150 mL    21 Aug 2020 07:01  -  21 Aug 2020 12:32  --------------------------------------------------------  IN: 120 mL / OUT: 0 mL / NET: 120 mL    Vital Signs Last 24 Hrs  T(C): 36.5 (21 Aug 2020 11:18), Max: 37.1 (20 Aug 2020 18:18)  T(F): 97.7 (21 Aug 2020 11:18), Max: 98.8 (20 Aug 2020 18:18)  HR: 100 (21 Aug 2020 12:06) (91 - 116)  BP: 97/59 (21 Aug 2020 11:18) (83/45 - 105/65)  BP(mean): 66 (20 Aug 2020 18:59) (66 - 66)  RR: 24 (21 Aug 2020 11:18) (20 - 24)  SpO2: 92% (21 Aug 2020 12:06) (85% - 100%)    PHYSICAL EXAM:  GENERAL: Cachectic, Sitting in bed  HEENT: NC/AT, Slightly dry mucous membranes  NECK: Supple  CHEST/LUNG: Audible rhonchi, Mildly tachypneic   HEART: Mild tachycardia, Regular rhythm  ABDOMEN: +BS, + Abdominal distention, Mild, diffuse TTP  EXTREMITIES: + B/L LE edema  NEUROLOGY: Awake and alert, Answering questions and following commands  SKIN: Warm and dry  PSYCH: Calm  Ext: Full ROM x 4      LABS:                        10.3   8.12  )-----------( 210      ( 21 Aug 2020 07:00 )             31.1     08-    125<L>  |  88<L>  |  92<H>  ----------------------------<  248<H>  4.7   |  20<L>  |  1.88<H>    Ca    8.5      21 Aug 2020 07:00  Phos  4.0     08-  Mg     2.5     08-    TPro  5.4<L>  /  Alb  3.2<L>  /  TBili  1.1  /  DBili  x   /  AST  18  /  ALT  18  /  AlkPhos  132<H>  08-21    PT/INR - ( 21 Aug 2020 07:00 )   PT: 18.0 sec;   INR: 1.54 ratio         PTT - ( 21 Aug 2020 07:00 )  PTT:32.0 sec      Urinalysis Basic - ( 20 Aug 2020 16:52 )    Color: Light Yellow / Appearance: Slightly Turbid / S.005 / pH: x  Gluc: x / Ketone: Negative  / Bili: Negative / Urobili: Negative   Blood: x / Protein: Negative / Nitrite: Negative   Leuk Esterase: Negative / RBC: 10 /hpf / WBC 0 /HPF   Sq Epi: x / Non Sq Epi: 0 /hpf / Bacteria: Negative    RADIOLOGY & ADDITIONAL TESTS:  Studies reviewed.

## 2020-08-21 NOTE — CONSULT NOTE ADULT - REASON FOR ADMISSION
Hyponatremia + SOB

## 2020-08-21 NOTE — PROGRESS NOTE ADULT - PROBLEM SELECTOR PLAN 2
Patient with abdominal distention c/w ascites, like 2/2 malignant effusion   - IR consulted - scheduled for diagnostic + therapeutic paracentesis 8/21  - pending CT chest, abdomen, pelvis   - giving dose of IV albumin 25 mg in 100 mg today to try to improve intravascular volume and prevent third spacing. consider IV diuresis if worsening SOB, as ascites likely contributing to SOB  - nausea + vomiting also likely 2/2 ascites  - reglan 5 mg Q8H  - Zofran 4mg IV Q8H PRN Multifactorial ANNMARIE, differential includes obstruction, .Renal function unstable, but has shown some improvement s/p bicarb drip, Worsening. Multifactorial, likely 2/2 obstruction from ascites, hypotension prior to admission could be contributing, and c/f hepatorenal syndrome  - will monitor BMP and obtain urine studies to further evaluate  - post-void bladder scan of 270, but difficult to measure due to ascites    #hyponetremia  Patient with Na 125 on repeat labs. Likely multifactorial, 2/2 hypervolemic hyponatremia in s/o ascites + hyponatremia from malignancy. Unclear if patient was vomiting prior to admission, but could also be contributing.   - urine studies pending   - 1200 ml fluid restriction  - dose of albumin to improve intravascular volume status Multifactorial ANNMARIE, main differential includes obstruction, and prerenal causes as indicated by urine labs, including HRS  - anion gap metabolic acidosis improving s/p bicarb drip, will change to oral bicarb  - hyperkalemia improved s/p insulin/D50 and lokelma, will continue Lokelma  - creatinine still elevated. will f/u nephrology recs, consider continuing albumin infusions and adding octreotide for full tx of HRS.  - repeat renal u/s to evaluate progression of hydronephrosis   - appreciate nephro recs, not candidate for RRT given comorbidities    #hyponetremia  Patient with Na 125 on repeat labs. Likely multifactorial, 2/2 hypervolemic hyponatremia in s/o ascites + hyponatremia from malignancy. Unclear if patient was vomiting prior to admission, but could also be contributing.   - urine studies pending   - 1200 ml fluid restriction  - dose of albumin to improve intravascular volume status

## 2020-08-21 NOTE — CONSULT NOTE ADULT - CONSULT REASON
ANNMARIE
GOC
History of pancreatic cancer
Hyperkalemia in the setting of worsening renal failure
Paracentesis
Pneumobilia and Abdominal Distension
elongated ingrown toenails
Hypoxia

## 2020-08-21 NOTE — DIETITIAN INITIAL EVALUATION ADULT. - PROBLEM SELECTOR PLAN 2
Patient with abdominal distention c/w ascites, like 2/2 malignant effusion   - per pt's daughter, had paracentesis ~June 2020   - will consult IR for diagnostic + therapeutic paracentesis   - will obtain CTAP  - consider IV diuresis if worsening SOB, as ascites likely contributing to SOB  - nausea + vomiting also likely 2/2 ascites  - reglan 5 mg Q8H  - Zofran 4mg IV Q8H PRN  - if continued vomiting, urgent CTAP to assess for obstruction  - otherwise, CTAP in AM, also r/o SBP

## 2020-08-21 NOTE — PROGRESS NOTE ADULT - PROBLEM SELECTOR PLAN 9
Transitions of Care Status:  1.  Name of PCP:  2.  PCP Contacted on Admission: [ ] Y    [x ] N  - evening admission, unclear who PCP is   3.  PCP contacted at Discharge: [ ] Y    [ ] N    [ ] N/A  4.  Post-Discharge Appointment Date and Location:  5.  Summary of Handoff given to PCP:
Transitions of Care Status:  1.  Name of PCP:  2.  PCP Contacted on Admission: [ ] Y    [x ] N  - evening admission, unclear who PCP is   3.  PCP contacted at Discharge: [ ] Y    [ ] N    [ ] N/A  4.  Post-Discharge Appointment Date and Location:  5.  Summary of Handoff given to PCP:

## 2020-08-21 NOTE — CONSULT NOTE ADULT - SUBJECTIVE AND OBJECTIVE BOX
HPI:  64M Pmhx pancreatic cancer (dx'ed 2020), COPD, presenting from nursing home with hypotension, hyponatremia and SOB.     Mr Ian was originally diagnosed with pancreatic cancer in 2020 during a hospital stay at [facility] and was subsequently discharged to nursing facility without any treatment. Patient BIBEMS from nursing home after found to be hypotensive to 90's systolic. During his hospital stay, patient has been identified to have hyponatremia, and metabolic acidosis in the setting of worsening renal function. The patient has been complaining of nausea, vomiting and increasing abdominal distention. Patient received a CTAP that identified large ascites, multifocal PNA, bilateral hydronephrosis (R>L). Patient has been evaluated by nephrology and oncology and deemed not a candidate for renal replacement therapy nor disease modifying therapy for his cancer at this time. MICU was initially consulted for worsening electrolyte abnormalities, he was deemed not to require ICU level of care in light of his hemodynamic stability at that time.    MICU was reconsulted for hypoxia, and altered mental status. Upon my arrival the patient was on 100% HFNC, noted to have copious oral secretions with waxing and waning alertness. He was unable to clearly explain his current medical condition or why he was in the hospital. He simply stated that he "wanted to go home". At that point I called his health care proxy wife Katherine and daughter Leighann, and explained the rapid decline in his condition. The patient was made DNR/DNI as the HCP wanted to focus on comfort in light of him not being a candidate for HD or receiving DMT for his pancreatic cancer. MOLST completed, dilaudid administered. Palliative fellow paged and consulted for end of life care and symptom management.     PAST MEDICAL & SURGICAL HISTORY:  Ascites  Pancreatic cancer  Simple chronic bronchitis: with home o2 PRN  Essential hypertension  Drug abuse  S/P hip replacement, right    FAMILY HISTORY:  Family history of early CAD    Allergies  No Known Allergies  Intolerances    HOME MEDICATIONS:  amoxicillin-clavulanate 875 mg-125 mg oral tablet: 1 tab(s) orally every 12 hours for 10 days (19 Aug 2020 18:57)  ascorbic acid 500 mg oral tablet: 1 tab(s) orally once a day (19 Aug 2020 18:57)  aspirin 81 mg oral delayed release tablet: 1 tab(s) orally once a day (19 Aug 2020 18:57)  docusate sodium 100 mg oral capsule: 2 cap(s) orally 2 times a day, As Needed (19 Aug 2020 18:57)  DuoNeb 0.5 mg-2.5 mg/3 mL inhalation solution: 3 milliliter(s) inhaled every 6 hours (19 Aug 2020 18:57)  Eliquis 5 mg oral tablet: 1 tab(s) orally 2 times a day (19 Aug 2020 18:57)  HumaLOG KwikPen 100 units/mL injectable solution: inject by subcutaneous 4 times a day  (730am,1130am,430pm,9pm) for 14 days    NOTIFY MD is blood sugar is above 400 or below 70    1 unit(s) = 150 - 199  2 unit(s) = 200 - 249  3 unit(s) = 250 - 299  4 unit(s) = 300 - 349  5 unit(s) = 350 - 400 (19 Aug 2020 18:57)  lactulose 20g/30mL oral solution: 30 milliliter(s) orally 2 times a day for 7 days (19 Aug 2020 18:57)  magnesium hydroxide 8% oral suspension: 30 milliliter(s) orally once a day, As Needed (19 Aug 2020 18:57)  midodrine 10 mg oral tablet: 1 tab(s) orally every 8 hours, As Needed (19 Aug 2020 18:57)  oxyCODONE 5 mg oral tablet: 1 tab(s) orally every 6 hours, As Needed (19 Aug 2020 18:57)  PriLOSEC OTC 20 mg oral delayed release tablet: 1 tab(s) orally 2 times a day (19 Aug 2020 18:57)  Reglan 5 mg oral tablet: 1 tab(s) orally 3 times a day for 2 weeks (19 Aug 2020 18:57)  senna 8.6 mg oral tablet: 1 tab(s) orally once a day (at bedtime) (19 Aug 2020 18:57)  Silvadene 1% topical cream: Apply topically to affected area 2 times a day (19 Aug 2020 18:57)  simethicone 80 mg oral tablet, chewable: 1 tab(s) orally every 8 hours, As Needed (19 Aug 2020 18:57)  Suboxone 8 mg-2 mg sublingual film: 1 film(s) sublingual 2 times a day (19 Aug 2020 18:57)  Tylenol Extra Strength 500 mg oral tablet: 2 tab(s) orally every 6 hours, As Needed - for mild pain (19 Aug 2020 18:57)    REVIEW OF SYSTEMS:  [ ] All other systems negative  [x] Unable to assess ROS because pt altered and labored breathing     OBJECTIVE:  ICU Vital Signs Last 24 Hrs  T(C): 36.6 (21 Aug 2020 13:40), Max: 37.1 (20 Aug 2020 18:18)  T(F): 97.9 (21 Aug 2020 13:40), Max: 98.8 (20 Aug 2020 18:18)  HR: 117 (21 Aug 2020 13:40) (91 - 117)  BP: 70/44 (21 Aug 2020 13:40) (70/44 - 105/65)  BP(mean): 66 (20 Aug 2020 18:59) (66 - 66)  RR: 24 (21 Aug 2020 13:40) (20 - 39)  SpO2: 99% (21 Aug 2020 13:40) (85% - 100%)     @ 07:  -   @ 07:00  --------------------------------------------------------  IN: 0 mL / OUT: 150 mL / NET: -150 mL     @ 07:  -   @ 15:37  --------------------------------------------------------  IN: 120 mL / OUT: 400 mL / NET: -280 mL    PHYSICAL EXAM:  GENERAL: moderate respiratory distress   HEAD:  Atraumatic, Normocephalic  EYES: EOMI, PERRLA, conjunctiva and sclera clear  ENMT: No tonsillar erythema, exudates, or enlargement; dry mucous membranes, copious oral secretions   NECK: Supple, No JVD  NERVOUS SYSTEM: AOX1, does not comply with neuro exam for full assessment, no focal deficits   PSYCHIATRIC: Appropriate affect and mood  CHEST/LUNG: +Diffuse rhonchi   HEART: Tachycardic, regular rhythm; No murmurs,  No LE edema  ABDOMEN: Soft, Nontender, +distended; Bowel sounds present  EXTREMITIES:  2+ Peripheral Pulses, No clubbing, cyanosis  SKIN: No rashes or lesions    HOSPITAL MEDICATIONS:  MEDICATIONS  (STANDING):  albuterol/ipratropium for Nebulization 3 milliLiter(s) Nebulizer every 6 hours  citric acid/sodium citrate Solution 30 milliLiter(s) Oral two times a day  HYDROmorphone  Injectable 1 milliGRAM(s) IV Push every 4 hours  piperacillin/tazobactam IVPB.. 3.375 Gram(s) IV Intermittent every 12 hours  silver sulfADIAZINE 1% Cream 1 Application(s) Topical two times a day  sodium chloride 3%  Inhalation 3 milliLiter(s) Inhalation two times a day    MEDICATIONS  (PRN):  acetaminophen  Suppository .. 650 milliGRAM(s) Rectal every 6 hours PRN Temp greater or equal to 38C (100.4F)  bisacodyl Suppository 10 milliGRAM(s) Rectal daily PRN Constipation  HYDROmorphone  Injectable 2 milliGRAM(s) IV Push every 2 hours PRN pain  LORazepam   Injectable 0.5 milliGRAM(s) IV Push every 4 hours PRN Anxiety/agitation  ondansetron Injectable 4 milliGRAM(s) IV Push every 8 hours PRN Nausea and/or Vomiting    LABS:                        10.3   8.12  )-----------( 210      ( 21 Aug 2020 07:00 )             31.1     08-21    125<L>  |  88<L>  |  92<H>  ----------------------------<  248<H>  4.7   |  20<L>  |  1.88<H>    Ca    8.5      21 Aug 2020 07:00  Phos  4.0     08-21  Mg     2.5     08-21    TPro  5.4<L>  /  Alb  3.2<L>  /  TBili  1.1  /  DBili  x   /  AST  18  /  ALT  18  /  AlkPhos  132<H>  08-21    PT/INR - ( 21 Aug 2020 07:00 )   PT: 18.0 sec;   INR: 1.54 ratio    PTT - ( 21 Aug 2020 07:00 )  PTT:32.0 sec    Urinalysis Basic - ( 20 Aug 2020 16:52 )    Color: Light Yellow / Appearance: Slightly Turbid / S.005 / pH: x  Gluc: x / Ketone: Negative  / Bili: Negative / Urobili: Negative   Blood: x / Protein: Negative / Nitrite: Negative   Leuk Esterase: Negative / RBC: 10 /hpf / WBC 0 /HPF   Sq Epi: x / Non Sq Epi: 0 /hpf / Bacteria: Negative    Venous Blood Gas:   @ 23:00  7.26/48/150/21/99  VBG Lactate: 1.3  Venous Blood Gas:   @ 08:53  7.25/48/29/48  VBG Lactate: 2.3

## 2020-08-21 NOTE — CONSULT NOTE ADULT - ASSESSMENT
65 yo M with metastatic pancreatic ca with MF PNA and Lg volume ascites.  Palliative care called for GOC.

## 2020-08-21 NOTE — DIETITIAN INITIAL EVALUATION ADULT. - PROBLEM SELECTOR PLAN 5
Multifactorial, likely 2/2 obstruction from ascites, hypotension prior to admission could be contributing   - will monitor BMP and obtain urine studies to further evaluate  - post-void bladder scan to r/o obstructive ANNMARIE given ascites  - elevated troponins likely 2/2 demand ischemia in s/o renal insufficiency. No signs/symptoms of ACS, no need to trend trops unless new c/o chest pain

## 2020-08-21 NOTE — CONSULT NOTE ADULT - ATTENDING COMMENTS
64 M with pancreatic adenocarcinoma, abdominal ascites, renal failure, and hyponatremia. Course has been complicated by worsening acidosis. Oncology evaluated patient and given his poor functional and nutritional status that he was not a candidate for disease modifying therapy. Nephrology also evaluated patient for worsening metabolic acidosis. He is a poor candidate for renal replacement therapy. MICU called for hypoxemia and respiratory distress. Improved with HFNC. Discussed with Mr. Castellano's case with his family - daughter (Leighann) and wife (Katherine). Patient's prognosis is poor given his advanced disease and declining clinical status. Discussed options, including palliative approach in which his comfort is prioritized. Family was amenable to this approach. He was made DNR/DNI. Palliative following with possible transfer to PCU.
Patient seen and examined.  Agree with resident note as above.  Patient with hx as noted including recently dx pancreatic cancer who presented 8/19 to [facility] with progressive dyspnea and hypoTN.  Tonight we are called due to worsening ANNMARIE and resultant hyperkalemia and acidemia.  CT today shows likely multifocal PNA and R>L hydro.  Paracentesis cell count bloody but with few white cells.    On exam patient is awake and alert.  Audible phlegm, and purulent secretions in yankauer.  Vitals stable.    Recs as above - follow cx, broaden abx to cover HCAP, gentle albumin resuscitation.  Agree with suggested renal recs - bicarb elton, lokelma, murillo.  Would consider consultation for nephrostomy if above resuscitation does not result in sufficient improvement, or if it is thought that a portion of his renal injury is obstructive in nature.  Patient requests full code, names his daughter as surrogate decision maker.
Patient is a 63 y/o M w/ a PMHx of recently diagnosed pancreatic cancer and COPD who was sent to the ED from a NH with hypotension, hyponatremia, and SOB, found to have worsening hypoxia in the setting of abdominal ascites, hyponatremia, and ANNMARIE vs CKD. Dyspnea likely 2/2 worsening abdominal ascites in the setting of malignancy and cirrhosis as well as underlying COPD. Per d/w patient's daughter, patient had a paracentesis ~ 3 weeks ago at [facility]. Abdominal U/S (8/20) was notable for liver cirrhosis w/ portal hypertension and moderate volume ascites. Recommend IR consult for therapeutic paracentesis. C/w Duonebs PRN and Symbicort. Wean supplemental O2 as tolerated. Recommend obtaining records from [facility] to help clarify recent bx results. Agree with checking CT C/A/P. Given patient's declining functional status, poor nutrition, and recurrent ascites, he would unlikely be a candidate for systemic therapy at this time. Recommend Palliative Care consult for GOC
acute kidney injury/ hyponatremia/hyperkalemia/ metabolic acidosis in a patient with advanced pancreatic carcinoma with liver cirrhosis/ ascites   very poor overall prognosis   plan as outlined in fellow's note above.

## 2020-08-21 NOTE — CHART NOTE - NSCHARTNOTEFT_GEN_A_CORE
Upon Nutritional Assessment by the Registered Dietitian your patient was determined to meet criteria / has evidence of the following diagnosis/diagnoses:          [ ]  Mild Protein Calorie Malnutrition        [ ]  Moderate Protein Calorie Malnutrition        [x] Severe Protein Calorie Malnutrition        [ ] Unspecified Protein Calorie Malnutrition        [x] Underweight / BMI <19        [ ] Morbid Obesity / BMI > 40      Findings as based on:  [x] Comprehensive nutrition assessment   [x] Nutrition Focused Physical Exam (visual)  [ ] Other:       Nutrition Plan/Recommendations:    1) Continue Regular diet. Defer fluids to team. Monitor/adjust as needed.  2) Recommend Ensure Enlive 3 daily (350 kcal, 20 g protein in each), thickened to appropriate consistency PRN, to optimize intake   3) Suggest multivitamin supplementation if no medical contraindications   4) Consider formal swallowing evaluation with speech pathologist in setting of swallowing difficulty   5) Encourage PO intake. Patient made aware RD remains available.   6) Monitor PO intake, weight, labs, skin, GI status, diet        PROVIDER Section:     By signing this assessment you are acknowledging and agree with the diagnosis/diagnoses assigned by the Registered Dietitian    Comments:

## 2020-08-21 NOTE — ADVANCED PRACTICE NURSE CONSULT - RECOMMEDATIONS
Will recommend the followin. BLE: gentle cleansing, apply Acticoat dressing, then follow with abdominal pads and secure loosely with Ace wrap- change every 3 days and prn for drainage.  2. Sween 24 cream to moisturize dry skin daily  3. Continue with leg elevation.  4. continue with turning and positioning  5. Nutrition support as pt condition allows  6. Podiatry consult for toenail hygiene  Tx plan discussed with RN

## 2020-08-21 NOTE — PROGRESS NOTE ADULT - PROBLEM SELECTOR PLAN 3
Patient with episodes of nausea + bilious emesis since admission, c/f obstruction   - pneumobilia found on abdominal ultrasound  - CT-chest, abdomen, pelvis pending  - surgery consulted Patient with abdominal distention c/w ascites, like 2/2 malignant effusion   - IR consulted - scheduled for diagnostic + therapeutic paracentesis 8/21  - pending CT chest, abdomen, pelvis   - giving dose of IV albumin 25 mg in 100 mg today to try to improve intravascular volume and prevent third spacing. consider IV diuresis if worsening SOB, as ascites likely contributing to SOB  - nausea + vomiting also likely 2/2 ascites  - reglan 5 mg Q8H  - Zofran 4mg IV Q8H PRN Patient with abdominal distention c/w ascites, s/p 2.75 L tap 8/20, SAAG score 1.7 indicating portal hypertension  - unclear etiology of liver cirrhosis, sending hep C labwork  - as per above, treatment of HRS  - reglan 5 mg Q8H  - Zofran 4mg IV Q8H PRN

## 2020-08-21 NOTE — CONSULT NOTE ADULT - SUBJECTIVE AND OBJECTIVE BOX
HPI:  64M Pmhx pancreatic cancer (dx'ed 2020), COPD, presenting from nursing home with hypotension, hyponatremia and SOB.     History is per nursing home chart, patient, and pt's daughter. Patient presents from nursing home after found to be hypotensive to 90's systolic. Upon arrival to ED, BP was 110 systolic, but patient was SOB with ascites and found to be hyponatremic to 125. Per pt's daughter patient was diagnosed with pancreatic cancer in 2020 at [facility], and may have started to undergo treatment, but for unclear reasons was discharged to nursing home and lost to follow up. When interviewed, patient was nauseas and vomiting bilious emesis. He reports that his nausea, abdominal distention, and SOB started 2-3 weeks ago. He also endorses generalized abdominal pain that is worse in RUQ, and lower extremity edema. He denies fever, chills, dizziness, headache, chest pain. He is unsure of further details and prognosis regarding his pancreatic cancer. He was found to be hyponatremic to 125, and was satting 99% on 3L NC. (19 Aug 2020 19:54)    PERTINENT PM/SXH:   Ascites  Pancreatic cancer  Simple chronic bronchitis  Essential hypertension  Drug abuse    S/P hip replacement, right    FAMILY HISTORY:  Family history of early CAD    ITEMS NOT CHECKED ARE NOT PRESENT    SOCIAL HISTORY:   Significant other/partner Katherine [x ]  Children Leighann DTR[ ]  Buddhist/Spirituality:  Substance hx:  [ ]   Tobacco hx:  [ ]   Alcohol hx: [ ]   Home Opioid hx:  [ ] I-Stop Reference No:  Living Situation: [x ]Home  [ ]Long term care  [ ]Rehab [ ]Other    ADVANCE DIRECTIVES:    DNR  Yes  MOLST  [x ]  updated 20  Living Will  [ ]     DECISION MAKER(s):  [x ] Health Care Proxy(s)  [ ] Surrogate(s)  [ ] Guardian           Name(s): Phone Number(s): Hoa [phone]     BASELINE (I)ADL(s) (prior to admission):  [place]: [ ]Total  [ ] Moderate [x ]Dependent    Allergies    No Known Allergies    Intolerances    MEDICATIONS  (STANDING):  albuterol/ipratropium for Nebulization 3 milliLiter(s) Nebulizer every 6 hours  apixaban 2.5 milliGRAM(s) Oral every 12 hours  budesonide  80 MICROgram(s)/formoterol 4.5 MICROgram(s) Inhaler 2 Puff(s) Inhalation two times a day  buprenorphine 8 mG/naloxone 2 mG SL  Tablet 1 Tablet(s) SubLingual <User Schedule>  citric acid/sodium citrate Solution 30 milliLiter(s) Oral two times a day  metoclopramide 5 milliGRAM(s) Oral three times a day  midodrine 20 milliGRAM(s) Oral every 8 hours  piperacillin/tazobactam IVPB.. 3.375 Gram(s) IV Intermittent every 12 hours  silver sulfADIAZINE 1% Cream 1 Application(s) Topical two times a day  sodium chloride 3%  Inhalation 3 milliLiter(s) Inhalation two times a day  sodium zirconium cyclosilicate 10 Gram(s) Oral every 8 hours    MEDICATIONS  (PRN):  acetaminophen   Tablet .. 1000 milliGRAM(s) Oral every 6 hours PRN Temp greater or equal to 38C (100.4F), Moderate Pain (4 - 6)  HYDROmorphone  Injectable 1 milliGRAM(s) IV Push every 4 hours PRN dyspnea  HYDROmorphone  Injectable 0.5 milliGRAM(s) IV Push every 4 hours PRN Severe Pain (7 - 10)  LORazepam   Injectable 0.2 milliGRAM(s) IV Push every 4 hours PRN Anxiety  magnesium hydroxide Suspension 30 milliLiter(s) Oral daily PRN Constipation  ondansetron Injectable 4 milliGRAM(s) IV Push every 8 hours PRN Nausea and/or Vomiting  simethicone 80 milliGRAM(s) Chew every 8 hours PRN Gas    PRESENT SYMPTOMS: [ x]Unable to obtain due to poor mentation   Source if other than patient:  [ ]Family   [ ]Team     Pain: [ ]yes [x ]no  QOL impact -   Location -                    Aggravating factors -  Quality -  Radiation -  Timing-  Severity (0-10 scale):  Minimal acceptable level (0-10 scale):     PAIN AD Score:     http://geriatrictoolkit.[place].[facility]/cog/painad.pdf (press ctrl +  left click to view)    Dyspnea:                           [ ]Mild [ ]Moderate [x ]Severe  Anxiety:                             x[ ]Mild [ ]Moderate [ ]Severe  Fatigue:                             [ ]Mild [ ]Moderate [x ]Severe  Nausea:                             [x ]Mild [ ]Moderate [ ]Severe  Loss of appetite:              [ ]Mild [ ]Moderate [x ]Severe  Constipation:                    [ ]Mild [ ]Moderate [ ]Severe    Other Symptoms:  [ ]All other review of systems negative     Palliative Performance Status Version 2:    10-20     %    http://npcrc.org/files/news/palliative_performance_scale_ppsv2.pdf  PHYSICAL EXAM:  Vital Signs Last 24 Hrs  T(C): 36.6 (21 Aug 2020 13:40), Max: 37.1 (20 Aug 2020 18:18)  T(F): 97.9 (21 Aug 2020 13:40), Max: 98.8 (20 Aug 2020 18:18)  HR: 117 (21 Aug 2020 13:40) (91 - 117)  BP: 70/44 (21 Aug 2020 13:40) (70/44 - 105/65)  BP(mean): 66 (20 Aug 2020 18:59) (66 - 66)  RR: 24 (21 Aug 2020 13:40) (20 - 39)  SpO2: 99% (21 Aug 2020 13:40) (85% - 100%) I&O's Summary    20 Aug 2020 07:01  -  21 Aug 2020 07:00  --------------------------------------------------------  IN: 0 mL / OUT: 150 mL / NET: -150 mL    21 Aug 2020 07:01  -  21 Aug 2020 14:26  --------------------------------------------------------  IN: 120 mL / OUT: 400 mL / NET: -280 mL      GENERAL:  [ ]Alert  [ ]Oriented x   [ x]Lethargic  [ ]Cachexia  [ ]Unarousable  [ ]Verbal  [x ]Non-Verbal    Behavioral:   [ ] Anxiety  [x ] Delirium [ ] Agitation [ ] Other    HEENT:  [ ]Normal   [ x]Dry mouth   [ ]ET Tube/Trach  [ ]Oral lesions    PULMONARY:   [ ]Clear [ ]Tachypnea  [x ]Audible excessive secretions   [x ]Rhonchi        [ ]Right [ ]Left [ ]Bilateral  [ ]Crackles        [ ]Right [ ]Left [ ]Bilateral  [ ]Wheezing     [ ]Right [ ]Left [ ]Bilatera  [ ]Diminished breath sounds [ ]right [ ]left [ ]bilateral    CARDIOVASCULAR:    [ ]Regular [ ]Irregular [x ]Tachy  [ ]Tommy [ ]Murmur [ ]Other    GASTROINTESTINAL:  [ ]Soft  [ x]Distended   [x ]+BS  [ ]Non tender [ ]Tender  [ ]PEG [ ]OGT/ NGT  Last BM:     GENITOURINARY:  [ ]Normal [x ] Incontinent   [ ]Oliguria/Anuria   [ ]Kay    [ ]Esternal cath    MUSCULOSKELETAL:   [ ]Normal   [ ]Weakness  [x ]Bed/Wheelchair bound [ ]Edema    NEUROLOGIC:   [ ]No focal deficits  [ ]Cognitive impairment  [x ]Dysphagia [ ]Dysarthria [ ]Paresis [ ]Other     SKIN: jaundice,   [ ]Normal    [ ]Rash  [ ]Pressure ulcer(s)       Present on admission [ ]y [ ]n    CRITICAL CARE:  [x ]Shock Present  [ ]Septic [ ]Cardiogenic [ ]Neurologic [ ]Hypovolemic  [ ]  Vasopressors [ ]  Inotropes   [x ]Respiratory failure present [ ]Mechanical ventilation [ ]Non-invasive ventilatory support [ ]High flow  [x ]Acute  [ ]Chronic [ ]Hypoxic  [ ]Hypercarbic [ ]Other  [ ]Other organ failure     LABS:                        10.3   8.12  )-----------( 210      ( 21 Aug 2020 07:00 )             31.1   08-21    125<L>  |  88<L>  |  92<H>  ----------------------------<  248<H>  4.7   |  20<L>  |  1.88<H>    Ca    8.5      21 Aug 2020 07:00  Phos  4.0     08-21  Mg     2.5     08-21    TPro  5.4<L>  /  Alb  3.2<L>  /  TBili  1.1  /  DBili  x   /  AST  18  /  ALT  18  /  AlkPhos  132<H>  08-21  PT/INR - ( 21 Aug 2020 07:00 )   PT: 18.0 sec;   INR: 1.54 ratio         PTT - ( 21 Aug 2020 07:00 )  PTT:32.0 sec    Urinalysis Basic - ( 20 Aug 2020 16:52 )    Color: Light Yellow / Appearance: Slightly Turbid / S.005 / pH: x  Gluc: x / Ketone: Negative  / Bili: Negative / Urobili: Negative   Blood: x / Protein: Negative / Nitrite: Negative   Leuk Esterase: Negative / RBC: 10 /hpf / WBC 0 /HPF   Sq Epi: x / Non Sq Epi: 0 /hpf / Bacteria: Negative      RADIOLOGY & ADDITIONAL STUDIES:    PROTEIN CALORIE MALNUTRITION PRESENT: [ ]mild [ ]moderate [ ]severe [ ]underweight [ ]morbid obesity  https://www.andeal.org/vault/2440/web/files/ONC/Table_Clinical%20Characteristics%20to%20Document%20Malnutrition-White%20JV%20et%20al%330659.pdf    Height (cm): 177.8 (20 @ 15:46)  Weight (kg): 62 (20 @ 15:46)  BMI (kg/m2): 19.6 (20 @ 15:46)    [ ]PPSV2 < or = to 30% [ ]significant weight loss  [ ]poor nutritional intake  [ ]anasarca     Albumin, Serum: 3.2 g/dL (20 @ 07:00)   [ ]Artificial Nutrition      REFERRALS:   [ ]Chaplaincy  [ ]Hospice  [ ]Child Life  [ ]Social Work  [ ]Case management [ ]Holistic Therapy     Goals of Care Document:

## 2020-08-22 NOTE — DISCHARGE NOTE FOR THE EXPIRED PATIENT - SECONDARY DIAGNOSIS.
ANNMARIE (acute kidney injury) Ascites Cachexia COPD (chronic obstructive pulmonary disease) Functional quadriplegia Hyperkalemia Hyponatremia Metabolic acidosis Metabolic encephalopathy Pancreatic cancer

## 2020-08-22 NOTE — DISCHARGE NOTE FOR THE EXPIRED PATIENT - HOSPITAL COURSE
64M Pmhx pancreatic cancer (dx'ed June 2020), COPD, presented from nursing home with hypotension, hyponatremia and SOB. History per nursing home chart, patient, and pt's daughter. Patient presented from nursing home after found to be hypotensive to 90's systolic. Upon arrival to ED, BP was 110 systolic, but patient was SOB with ascites and found to be hyponatremic to 125. Per pt's daughter patient was diagnosed with pancreatic cancer in June 2020 at [facility], and may have started to undergo treatment, but for unclear reasons was discharged to nursing home and lost to follow up. When interviewed, patient was nauseous and vomiting bilious emesis. He reported that his nausea, abdominal distention, and SOB started 2-3 weeks ago. He also endorsed generalized abdominal pain worse in RUQ, and lower extremity edema. He denied fever, chills, dizziness, headache, chest pain. He was unsure of further details and prognosis regarding his pancreatic cancer. He was found to be hyponatremic to 125, and was satting 99% on 3L NC. CT chest abdomen pelvis were obtained and showed large volume ascites and multifocal pneumonia. He was determined to have acute on chronic hypoxic respiratory failure presumed to be from lung restriction secondary to ascites and volume overload from advanced pancreatic cancer. Interventions: patient was started on breathing treatments and antiemetics, therapeutic paracentesis was performed by IR with 2750 cc yield. ANNMARIE w metabolic acidoses was also present and hyponatremia, fluid restriction for hyponatremia w/albumin . 64M Pmhx pancreatic cancer (dx'ed June 2020), COPD, presented from nursing home with hypotension, hyponatremia and SOB. History per nursing home chart, patient, and pt's daughter. Patient presented from nursing home after found to be hypotensive to 90's systolic. Upon arrival to ED, BP was 110 systolic, but patient was SOB with ascites and found to be hyponatremic to 125. Per pt's daughter patient was diagnosed with pancreatic cancer in June 2020 at [facility], and may have started to undergo treatment, but for unclear reasons was discharged to nursing home and lost to follow up. When interviewed, patient was nauseous and vomiting bilious emesis. He reported that his nausea, abdominal distention, and SOB started 2-3 weeks ago. He also endorsed generalized abdominal pain worse in RUQ, and lower extremity edema. He denied fever, chills, dizziness, headache, chest pain. He was unsure of further details and prognosis regarding his pancreatic cancer. He was found to be hyponatremic to 125, and was satting 99% on 3L NC. CT chest abdomen pelvis were obtained and showed large volume ascites and multifocal pneumonia. He was determined to have acute on chronic hypoxic respiratory failure presumed to be from lung restriction secondary to ascites and volume overload from advanced pancreatic cancer. Interventions: patient was started on breathing treatments and antiemetics, therapeutic paracentesis was performed by IR with 2750 cc yield. ANNMARIE w metabolic acidoses was also present and hyponatremia, fluid restriction for hyponatremia w/albumin given.  Patient was evaluated by nephrology and oncology and deemed not a candidate for renal replacement therapy nor disease modifying therapy for his cancer at this time. MICU was initially consulted for worsening electrolyte abnormalities, he was deemed not to require ICU level of care in light of his hemodynamic stability at that time.    MICU was reconsulted for hypoxia, and altered mental status. Upon my arrival the patient was on 100% HFNC, noted to have copious oral secretions with waxing and waning alertness. He was unable to clearly explain his current medical condition or why he was in the hospital. He simply stated that he "wanted to go home". At that point I called his health care proxy wife Katherine and daughter Leighann, and explained the rapid decline in his condition. The patient was made DNR/DNI as the HCP wanted to focus on comfort in light of him not being a candidate for HD or receiving DMT for his pancreatic cancer. PEDRO completed, dilaudid administered. Palliative fellow paged and consulted for end of life care and symptom management. 64M Pmhx pancreatic cancer (dx'ed June 2020), COPD, presented from nursing home with hypotension, hyponatremia and SOB. History per nursing home chart, patient, and pt's daughter. Patient presented from nursing home after found to be hypotensive to 90's systolic. Upon arrival to ED, BP was 110 systolic, but patient was SOB with ascites and found to be hyponatremic to 125. Per pt's daughter patient was diagnosed with pancreatic cancer in June 2020 at [facility], and may have started to undergo treatment, but for unclear reasons was discharged to nursing home and lost to follow up. When interviewed, patient was nauseous and vomiting bilious emesis. He reported that his nausea, abdominal distention, and SOB started 2-3 weeks ago. He also endorsed generalized abdominal pain worse in RUQ, and lower extremity edema. He denied fever, chills, dizziness, headache, chest pain. He was unsure of further details and prognosis regarding his pancreatic cancer. He was found to be hyponatremic to 125, and was satting 99% on 3L NC. CT chest abdomen pelvis were obtained and showed large volume ascites and multifocal pneumonia. He was determined to have acute on chronic hypoxic respiratory failure presumed to be from lung restriction secondary to ascites and volume overload from advanced pancreatic cancer. Interventions: patient was started on breathing treatments and antiemetics, therapeutic paracentesis was performed by IR with 2750 cc yield. ANNMARIE w metabolic acidoses was also present and hyponatremia, fluid restriction for hyponatremia w/albumin given. Appreciate MICU note, which stated, Patient was evaluated by nephrology and oncology and deemed not a candidate for renal replacement therapy nor disease modifying therapy for his cancer at this time. MICU was initially consulted for worsening electrolyte abnormalities, he was deemed not to require ICU level of care in light of his hemodynamic stability at that time. MICU was reconsulted for hypoxia, and altered mental status. Upon my arrival the patient was on 100% HFNC, noted to have copious oral secretions with waxing and waning alertness. He was unable to clearly explain his current medical condition or why he was in the hospital. He simply stated that he "wanted to go home". At that point I called his health care proxy wife Katherine and daughter Leighann, and explained the rapid decline in his condition. The patient was made DNR/DNI as the HCP wanted to focus on comfort in light of him not being a candidate for HD or receiving DMT for his pancreatic cancer. MOLST completed, dilaudid administered. Palliative fellow paged and consulted for end of life care and symptom management.   Over the evening and night, patient decompensated, blood pressure worsened and patient started to have agonal breathing. Eventually bp went down to 40s/20s and then patient passed away.

## 2020-08-24 LAB
-  AMIKACIN: SIGNIFICANT CHANGE UP
-  AZTREONAM: SIGNIFICANT CHANGE UP
-  CEFEPIME: SIGNIFICANT CHANGE UP
-  CEFTAZIDIME: SIGNIFICANT CHANGE UP
-  CIPROFLOXACIN: SIGNIFICANT CHANGE UP
-  GENTAMICIN: SIGNIFICANT CHANGE UP
-  IMIPENEM: SIGNIFICANT CHANGE UP
-  LEVOFLOXACIN: SIGNIFICANT CHANGE UP
-  MEROPENEM: SIGNIFICANT CHANGE UP
-  PIPERACILLIN/TAZOBACTAM: SIGNIFICANT CHANGE UP
-  TOBRAMYCIN: SIGNIFICANT CHANGE UP
CULTURE RESULTS: SIGNIFICANT CHANGE UP
CULTURE RESULTS: SIGNIFICANT CHANGE UP
METHOD TYPE: SIGNIFICANT CHANGE UP
NON-GYNECOLOGICAL CYTOLOGY STUDY: SIGNIFICANT CHANGE UP
ORGANISM # SPEC MICROSCOPIC CNT: SIGNIFICANT CHANGE UP
SPECIMEN SOURCE: SIGNIFICANT CHANGE UP
SPECIMEN SOURCE: SIGNIFICANT CHANGE UP

## 2020-08-25 LAB
CULTURE RESULTS: SIGNIFICANT CHANGE UP
CULTURE RESULTS: SIGNIFICANT CHANGE UP
SPECIMEN SOURCE: SIGNIFICANT CHANGE UP
SPECIMEN SOURCE: SIGNIFICANT CHANGE UP

## 2020-09-19 LAB
CULTURE RESULTS: SIGNIFICANT CHANGE UP
SPECIMEN SOURCE: SIGNIFICANT CHANGE UP

## 2023-06-23 NOTE — PHYSICAL THERAPY INITIAL EVALUATION ADULT - ADDITIONAL COMMENTS
as per pt, resides in a  with family, prior to recent admission, pt amb (I), (I) with ADls, this time, pt a/f rehab, while in rehab, pt amb very limited with assist and RW, required assist for all functional mobility
English